# Patient Record
Sex: FEMALE | Race: WHITE | NOT HISPANIC OR LATINO | Employment: FULL TIME | URBAN - METROPOLITAN AREA
[De-identification: names, ages, dates, MRNs, and addresses within clinical notes are randomized per-mention and may not be internally consistent; named-entity substitution may affect disease eponyms.]

---

## 2018-01-08 ENCOUNTER — TRANSFERRED RECORDS (OUTPATIENT)
Dept: HEALTH INFORMATION MANAGEMENT | Facility: CLINIC | Age: 38
End: 2018-01-08

## 2018-01-31 ENCOUNTER — OFFICE VISIT (OUTPATIENT)
Dept: NEUROSURGERY | Facility: CLINIC | Age: 38
End: 2018-01-31
Attending: NURSE PRACTITIONER
Payer: COMMERCIAL

## 2018-01-31 VITALS
SYSTOLIC BLOOD PRESSURE: 141 MMHG | DIASTOLIC BLOOD PRESSURE: 84 MMHG | HEART RATE: 75 BPM | WEIGHT: 175.8 LBS | OXYGEN SATURATION: 99 %

## 2018-01-31 DIAGNOSIS — M54.12 CERVICAL RADICULAR PAIN: Primary | ICD-10-CM

## 2018-01-31 DIAGNOSIS — M54.50 LUMBAR PAIN ON PALPATION: ICD-10-CM

## 2018-01-31 PROCEDURE — G0463 HOSPITAL OUTPT CLINIC VISIT: HCPCS | Performed by: NURSE PRACTITIONER

## 2018-01-31 PROCEDURE — 99243 OFF/OP CNSLTJ NEW/EST LOW 30: CPT | Performed by: NURSE PRACTITIONER

## 2018-01-31 RX ORDER — HYDROCODONE BITARTRATE AND ACETAMINOPHEN 5; 325 MG/1; MG/1
1 TABLET ORAL EVERY 6 HOURS PRN
COMMUNITY
End: 2018-08-31

## 2018-01-31 RX ORDER — PROMETHAZINE HYDROCHLORIDE 25 MG/1
25 TABLET ORAL EVERY 6 HOURS PRN
COMMUNITY

## 2018-01-31 RX ORDER — CITALOPRAM HYDROBROMIDE 20 MG/1
20 TABLET ORAL DAILY
COMMUNITY
End: 2019-02-08

## 2018-01-31 ASSESSMENT — PAIN SCALES - GENERAL: PAINLEVEL: SEVERE PAIN (7)
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
## 2018-01-31 NOTE — PATIENT INSTRUCTIONS
1.  Chiropractic care with Dr. Case.      2.  Injection therapy with cervical epidural injection.  [phone]    3.  Please contact the clinic if pain persists at [phone].

## 2018-01-31 NOTE — MR AVS SNAPSHOT
After Visit Summary   1/31/2018    Savita Matthews    MRN: [id number]           Patient Information     Date Of Birth          1980        Visit Information        Provider Department      1/31/2018 2:40 PM Buffy Quiroz APRN CNP [facility]        Today's Diagnoses     Cervical radicular pain    -  1    Lumbar pain on palpation          Care Instructions    1.  Chiropractic care with Dr. Case.      2.  Injection therapy with cervical epidural injection.  [phone]    3.  Please contact the clinic if pain persists at [phone].           Follow-ups after your visit        Additional Services     [facility] PT, HAND, AND CHIROPRACTIC REFERRAL       **This order will print in the [facility] Scheduling Office** Dr. Case     Physical Therapy, Hand Therapy and Chiropractic Care are available through:    *[facility]  *[facility]  *[facility]    Call one number to schedule at any of the above locations: [phone].    Your provider has referred you to: Chiropractic at [facility] or [facility]    Indication/Reason for Referral:neck and low back painb  Onset of Illness: 6/2017  Therapy Orders: Evaluate and Treat  Special Programs: Acupuncture and None  Special Request: Manual Therapy: Myofascial Release/Massage  None    Kin Sanderson      Additional Comments for the Therapist or Chiropractor:     Please be aware that coverage of these services is subject to the terms and limitations of your health insurance plan.  Call member services at your health plan with any benefit or coverage questions.      Please bring the following to your appointment:    *Your personal calendar for scheduling future appointments  *Comfortable clothing                  Your next 10 appointments already scheduled     Jan 31, 2018  2:40 PM CST   New Visit with KRAIG Aguilar CNP   [facility] ([facility])  "   [address]  [facility] [id number] [phone]              Future tests that were ordered for you today     Open Future Orders        Priority Expected Expires Ordered    XR Cervical/Thoracic Epidural Inj Incl Imaging Routine 2018            Who to contact     If you have questions or need follow up information about today's clinic visit or your schedule please contact [facility] directly at [phone].  Normal or non-critical lab and imaging results will be communicated to you by MyChart, letter or phone within 4 business days after the clinic has received the results. If you do not hear from us within 7 days, please contact the clinic through [employer] or phone. If you have a critical or abnormal lab result, we will notify you by phone as soon as possible.  Submit refill requests through [facility] or call your pharmacy and they will forward the refill request to us. Please allow 3 business days for your refill to be completed.          Additional Information About Your Visit        [employer] Information     [facility] lets you send messages to your doctor, view your test results, renew your prescriptions, schedule appointments and more. To sign up, go to [email or web address] . Click on \"Log in\" on the left side of the screen, which will take you to the Welcome page. Then click on \"Sign up Now\" on the right side of the page.     You will be asked to enter the access code listed below, as well as some personal information. Please follow the directions to create your username and password.     Your access code is: [email or web address]  Expires: 2018  2:39 PM     Your access code will  in 90 days. If you need help or a new code, please call your [place] clinic or [phone].        Care EveryWhere ID     This is your Care EveryWhere ID. This could be used by other organizations to access your [place] medical records  [id number]        Your " Vitals Were     Pulse Pulse Oximetry                75 99%           Blood Pressure from Last 3 Encounters:   01/31/18 141/84    Weight from Last 3 Encounters:   01/31/18 175 lb 12.8 oz (79.7 kg)              We Performed the Following     [facility] PT, HAND, AND CHIROPRACTIC REFERRAL        Primary Care Provider Office Phone # Fax #    [facility] [phone] [address]  [facility] [address]        Equal Access to Services     HOA [place] : Hadii aad ku hadasho Soomaali, waaxda luqadaha, qaybta kaalmada adeegyada, waxay idiin hayaan adeeg kharash lalester . So [facility] [phone].    ATENCIÓN: Si habla español, tiene a lui disposición servicios gratuitos de asistencia lingüística. [facility] [phone].    We comply with applicable federal civil rights laws and [place] laws. We do not discriminate on the basis of race, color, national origin, age, disability, sex, sexual orientation, or gender identity.            Thank you!     Thank you for choosing [facility]  for your care. Our goal is always to provide you with excellent care. Hearing back from our patients is one way we can continue to improve our services. Please take a few minutes to complete the written survey that you may receive in the mail after your visit with us. Thank you!             Your Updated Medication List - Protect others around you: Learn how to safely use, store and throw away your medicines at www.disposemymeds.org.          This list is accurate as of 1/31/18  2:39 PM.  Always use your most recent med list.                   Brand Name Dispense Instructions for use Diagnosis    BACLOFEN PO      Take 10 mg by mouth 2 times daily        citalopram 20 MG tablet    celeXA     Take 20 mg by mouth daily        GABAPENTIN PO      Take 100 mg by mouth daily        HYDROcodone-acetaminophen 5-325 MG per tablet    NORCO     Take 1 tablet by mouth every 6 hours as needed for moderate to  severe pain        MELOXICAM PO      Take 15 mg by mouth daily        promethazine 25 MG tablet    PHENERGAN     Take 25 mg by mouth every 6 hours as needed for nausea        RIZATRIPTAN BENZOATE PO      Take 5 mg by mouth once as needed for migraine        TIZANIDINE HCL PO      Take 2 mg by mouth once        TYLENOL PO      Take 500 mg by mouth 2 times daily
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
## 2018-02-05 ENCOUNTER — TELEPHONE (OUTPATIENT)
Dept: NEUROSURGERY | Facility: CLINIC | Age: 38
End: 2018-02-05

## 2018-02-05 NOTE — TELEPHONE ENCOUNTER
Pt would like call back to discuss specific verbiage in documentation to be submitted to insurance regarding her neck. Also wants to discuss increased leg symptoms. Try cell first, then work number. Ok to leave detailed message on cell.

## 2018-02-05 NOTE — TELEPHONE ENCOUNTER
Spoke to Savita via phone.  She will request a copy of dictated notes for her records from our medical records department.  She states that she is scheduled for her injection tomorrow and is hopeful that this procedure will provide some relief.  She would like to reports that over the past 1-2 days she has noted an increase in lower back pain with some radiation down her right leg, which she reports is new from her previous visit.  She denies any acute even which precipitated her increase level of pain.  I instructed her to call again with any new or worsening symptoms.

## 2018-02-06 ENCOUNTER — HOSPITAL ENCOUNTER (OUTPATIENT)
Facility: CLINIC | Age: 38
Discharge: HOME | End: 2018-02-06
Admitting: PHYSICIAN ASSISTANT
Payer: COMMERCIAL

## 2018-02-06 ENCOUNTER — HOSPITAL ENCOUNTER (OUTPATIENT)
Dept: GENERAL RADIOLOGY | Facility: CLINIC | Age: 38
End: 2018-02-06
Attending: NURSE PRACTITIONER | Admitting: COLON & RECTAL SURGERY
Payer: COMMERCIAL

## 2018-02-06 VITALS
HEART RATE: 64 BPM | BODY MASS INDEX: 25.01 KG/M2 | TEMPERATURE: 97 F | SYSTOLIC BLOOD PRESSURE: 148 MMHG | RESPIRATION RATE: 18 BRPM | DIASTOLIC BLOOD PRESSURE: 65 MMHG | OXYGEN SATURATION: 99 % | HEIGHT: 68 IN | WEIGHT: 165 LBS

## 2018-02-06 DIAGNOSIS — M54.12 CERVICAL RADICULAR PAIN: ICD-10-CM

## 2018-02-06 PROCEDURE — 25000125 ZZHC RX 250: Performed by: PHYSICIAN ASSISTANT

## 2018-02-06 PROCEDURE — 27210986 XR CERVICAL/THORACIC EPIDURAL INJ, INCL IMAGING

## 2018-02-06 PROCEDURE — 40000863 ZZH STATISTIC RADIOLOGY XRAY, US, CT, MAR, NM

## 2018-02-06 PROCEDURE — 25000128 H RX IP 250 OP 636: Performed by: PHYSICIAN ASSISTANT

## 2018-02-06 PROCEDURE — 25500064 ZZH RX 255 OP 636: Performed by: PHYSICIAN ASSISTANT

## 2018-02-06 PROCEDURE — 25000132 ZZH RX MED GY IP 250 OP 250 PS 637: Performed by: PHYSICIAN ASSISTANT

## 2018-02-06 RX ORDER — OXYCODONE AND ACETAMINOPHEN 5; 325 MG/1; MG/1
1 TABLET ORAL EVERY 4 HOURS PRN
COMMUNITY
End: 2018-08-31

## 2018-02-06 RX ORDER — HYDROCODONE BITARTRATE AND ACETAMINOPHEN 5; 325 MG/1; MG/1
1-2 TABLET ORAL ONCE
Status: COMPLETED | OUTPATIENT
Start: 2018-02-06 | End: 2018-02-06

## 2018-02-06 RX ORDER — DEXAMETHASONE SODIUM PHOSPHATE 10 MG/ML
2 INJECTION, SOLUTION INTRAMUSCULAR; INTRAVENOUS ONCE
Status: COMPLETED | OUTPATIENT
Start: 2018-02-06 | End: 2018-02-06

## 2018-02-06 RX ORDER — IOPAMIDOL 408 MG/ML
10 INJECTION, SOLUTION INTRATHECAL ONCE
Status: COMPLETED | OUTPATIENT
Start: 2018-02-06 | End: 2018-02-06

## 2018-02-06 RX ADMIN — LIDOCAINE HYDROCHLORIDE 2.5 ML: 10 INJECTION, SOLUTION EPIDURAL; INFILTRATION; INTRACAUDAL; PERINEURAL at 15:35

## 2018-02-06 RX ADMIN — IOPAMIDOL 0.5 ML: 408 INJECTION, SOLUTION INTRATHECAL at 15:36

## 2018-02-06 RX ADMIN — DEXAMETHASONE SODIUM PHOSPHATE 2 MG: 10 INJECTION, SOLUTION INTRAMUSCULAR; INTRAVENOUS at 15:48

## 2018-02-06 RX ADMIN — HYDROCODONE BITARTRATE AND ACETAMINOPHEN 1 TABLET: 5; 325 TABLET ORAL at 16:18

## 2018-02-06 NOTE — PROGRESS NOTES
1555  Patient returned to [facility], s/p NOLAN.  She is crying, states pain is 10/10.  I asked the [employer] tech to have Theo JOSHI put in some Vicodin orders.     1605  Pain down to 7/10 over about 10 minutes.  I paged Theo Crews, asking for Vicodin order.  Patient taking po fluids, declined snack.   at bedside.      1640  Patient starting to feel better from Vicodin.  Pain rated about 4/10.  Site is WDL, no bleeding or swelling.      1650  Discharged per W/C with .

## 2018-02-06 NOTE — DISCHARGE INSTRUCTIONS
Steroid Injection Discharge Instructions     After you go home:      You may resume your normal diet.    Care of Puncture Site:      If you have a bandaid on your puncture site, you may remove it the next morning    You may shower tomorrow    No bath tubs, whirlpools or swimming for at least 3 days     Activity:      You may go back to normal activity in 24 hours    You should let pain be your guide as to the extent of your activities    Maintain any activity limitations as ordered by your provider    Do NOT drive a vehicle until tomorrow    Medicines:      You may resume all medications    For minor pain, you may take Acetaminophen (Tylenol) or Ibuprofen (Advil)    Pain:       You may experience increased or different pain over the next 24-48 hours    For the next 48 hrs - you may use ice packs for discomfort     Call your primary care doctor if:      You have severe pain that does not improve with pain medication    You have chills or a fever greater than 101 F (38 C)    The site is red, swollen, hot or tender    New problems with your bowel or bladder    Any questions or concerns.          For Your Information:      A steroid was injected to help decrease swelling and may help to reduce pain. It may take up to 7-10 days to obtain full results.    Some patients will get lasting relief from a single injection. Others may require up to 3 injections to get results. If you have more than one steroid injection, they should be given 2 weeks apart.    Side effects of your steroid injection are mild and will go away in 2-3 days  - Insomnia  - Heartburn  - Flushed face  - Water retention  - Increased appetite  - Increased blood sugar      If you have questions call:        [facility] Radiology Dept @ [phone]

## 2018-02-06 NOTE — IP AVS SNAPSHOT
MRN:[id number]                      After Visit Summary   2/6/2018    Savita Matthews    MRN: [id number]           Visit Information        Department      2/6/2018  1:57 PM [facility]          Review of your medicines      UNREVIEWED medicines. Ask your doctor about these medicines        Dose / Directions    BACLOFEN PO        Dose:  10 mg   Take 10 mg by mouth 2 times daily   Refills:  0       citalopram 20 MG tablet   Commonly known as:  celeXA        Dose:  20 mg   Take 20 mg by mouth daily   Refills:  0       GABAPENTIN PO        Dose:  200 mg   Take 200 mg by mouth At Bedtime   Refills:  0       HYDROcodone-acetaminophen 5-325 MG per tablet   Commonly known as:  NORCO        Dose:  1 tablet   Take 1 tablet by mouth every 6 hours as needed for moderate to severe pain   Refills:  0       MELOXICAM PO        Dose:  15 mg   Take 15 mg by mouth daily   Refills:  0       oxyCODONE-acetaminophen 5-325 MG per tablet   Commonly known as:  PERCOCET        Dose:  1 tablet   Take 1 tablet by mouth every 4 hours as needed for moderate to severe pain   Refills:  0       promethazine 25 MG tablet   Commonly known as:  PHENERGAN        Dose:  25 mg   Take 25 mg by mouth every 6 hours as needed for nausea   Refills:  0       RIZATRIPTAN BENZOATE PO        Dose:  5 mg   Take 5 mg by mouth once as needed for migraine   Refills:  0       TIZANIDINE HCL PO        Dose:  2 mg   Take 2 mg by mouth once   Refills:  0       TYLENOL PO        Dose:  500 mg   Take 500 mg by mouth 2 times daily   Refills:  0                Protect others around you: Learn how to safely use, store and throw away your medicines at www.disposemymeds.org.         Follow-ups after your visit        Your next 10 appointments already scheduled     Feb 06, 2018  3:00 PM CST   XR CERVICAL/THORACIC EPIDURAL INJECTION with DRISSXR4, DRISS IMAGING NURSE, SH MSK RAD   [facility] Radiology ([facility])    [address]  [place] [id number] [phone]           For nerve root injection, please send or bring copies of any MRIs or other scans you have had.  Bring a list of your current medicines to your exam. (Include vitamins, minerals and over-the-counter medicines.) Leave your valuables at home.  Plan to have someone drive you home afterward.  Stop taking the following medicines (but talk to your doctor first):   If you take blood thinners, you may need to stop taking them a few days before treatment. Talk to your doctor before stopping these medicines.Stop taking Coumadin (warfarin) 3 days before treatment. Restart the day after treatment.   If you take Plavix, Ticlid, Pletal or Persantine, please ask your doctor if you should stop these medicines. You may need extra tests on the morning of your scan.   If you take Xarelto (Rivaroxaban), you may need to stop taking it 24 hours before treatment. Talk to your doctor before stopping this medicine. Restart the day after treatment.  You may take your other medicines as normal.  Stop all food and drink (including water) 3 hours before your test or treatment.  Please tell the doctor:   If you are allergic to X-ray dye (contrast fluid).   If you may be pregnant.  Injections take about 30 to 45 minutes. Most people spend up to 2 hours in the clinic or hospital.  Please call the Imaging Department at your exam site with any questions.            Feb 26, 2018  9:30 AM CST   (Arrive by 9:15 AM)   [facility] Chiropractor with DAVIDE Virk ([facility]  )    [address]  [place] [id number] [phone]               Care Instructions        Further instructions from your care team       Steroid Injection Discharge Instructions     After you go home:      You may resume your normal diet.    Care of Puncture Site:      If you have a bandaid on your puncture site, you may remove it the next morning    You may shower tomorrow    No  "bath tubs, whirlpools or swimming for at least 3 days     Activity:      You may go back to normal activity in 24 hours    You should let pain be your guide as to the extent of your activities    Maintain any activity limitations as ordered by your provider    Do NOT drive a vehicle until tomorrow    Medicines:      You may resume all medications    For minor pain, you may take Acetaminophen (Tylenol) or Ibuprofen (Advil)    Pain:       You may experience increased or different pain over the next 24-48 hours    For the next 48 hrs - you may use ice packs for discomfort     Call your primary care doctor if:      You have severe pain that does not improve with pain medication    You have chills or a fever greater than 101 F (38 C)    The site is red, swollen, hot or tender    New problems with your bowel or bladder    Any questions or concerns.          For Your Information:      A steroid was injected to help decrease swelling and may help to reduce pain. It may take up to 7-10 days to obtain full results.    Some patients will get lasting relief from a single injection. Others may require up to 3 injections to get results. If you have more than one steroid injection, they should be given 2 weeks apart.    Side effects of your steroid injection are mild and will go away in 2-3 days  - Insomnia  - Heartburn  - Flushed face  - Water retention  - Increased appetite  - Increased blood sugar      If you have questions call:        [facility] Radiology Dept @ [phone]               Additional Information About Your Visit        CoAlignharWhereoscope Information     CoAlignhart lets you send messages to your doctor, view your test results, renew your prescriptions, schedule appointments and more. To sign up, go to [email or web address] . Click on \"Log in\" on the left side of the screen, which will take you to the Welcome page. Then click on \"Sign up Now\" on the right side of the page.     You will be asked to enter the access " "code listed below, as well as some personal information. Please follow the directions to create your username and password.     Your access code is: [email or web address]  Expires: 2018  2:39 PM     Your access code will  in 90 days. If you need help or a new code, please call your [facility] clinic or [phone].        Care EveryWhere ID     This is your Care EveryWhere ID. This could be used by other organizations to access your [facility] medical records  [id number]        Your Vitals Were     Blood Pressure Pulse Temperature Respirations Height Weight    140/85 (BP Location: Left arm) 76 97  F (36.1  C) (Oral) 16 1.727 m (5' 8\") 74.8 kg (165 lb)    Pulse Oximetry BMI (Body Mass Index)                99% 25.09 kg/m2           Primary Care Provider Office Phone # Fax #    [facility] [phone]      Equal Access to Services     HOA [place] : Hadii aad ku hadasho Soomaali, waaxda luqadaha, qaybta kaalmada adeegyada, waxay idiin hayjanette franklin . So [facility] [phone].    ATENCIÓN: Si habla español, tiene a lui disposición servicios gratuitos de asistencia lingüística. Pabloame al [phone].    We comply with applicable federal civil rights laws and [place] laws. We do not discriminate on the basis of race, color, national origin, age, disability, sex, sexual orientation, or gender identity.            Thank you!     Thank you for choosing [facility] for your care. Our goal is always to provide you with excellent care. Hearing back from our patients is one way we can continue to improve our services. Please take a few minutes to complete the written survey that you may receive in the mail after you visit with us. Thank you!             Medication List: This is a list of all your medications and when to take them. Check marks below indicate your daily home schedule. Keep this list as a reference.      Medications           Morning Afternoon Evening Bedtime As Needed    " BACLOFEN PO   Take 10 mg by mouth 2 times daily                                citalopram 20 MG tablet   Commonly known as:  celeXA   Take 20 mg by mouth daily                                GABAPENTIN PO   Take 200 mg by mouth At Bedtime                                HYDROcodone-acetaminophen 5-325 MG per tablet   Commonly known as:  NORCO   Take 1 tablet by mouth every 6 hours as needed for moderate to severe pain                                MELOXICAM PO   Take 15 mg by mouth daily                                oxyCODONE-acetaminophen 5-325 MG per tablet   Commonly known as:  PERCOCET   Take 1 tablet by mouth every 4 hours as needed for moderate to severe pain                                promethazine 25 MG tablet   Commonly known as:  PHENERGAN   Take 25 mg by mouth every 6 hours as needed for nausea                                RIZATRIPTAN BENZOATE PO   Take 5 mg by mouth once as needed for migraine                                TIZANIDINE HCL PO   Take 2 mg by mouth once                                TYLENOL PO   Take 500 mg by mouth 2 times daily

## 2018-02-06 NOTE — PROGRESS NOTES
RADIOLOGY PROCEDURE NOTE  Patient name: Savita Matthews  MRN: [id number]  : 1980    Pre-procedure diagnosis: Neck pain  Post-procedure diagnosis: Same    Procedure Date/Time: 2018  3:44 PM  Procedure: Cervical EVERTON  Estimated blood loss: None  Specimen(s) collected with description: none  The patient tolerated the procedure well with no immediate complications.  Significant findings:none    See imaging dictation for procedural details.    Provider name: Theo Crews  Assistant(s):None

## 2018-02-06 NOTE — PROGRESS NOTES
Pre  Procedure plan of care reviewed with pt and spouse pre procedure.  All questions answered and pt appears to accept and understand. D/C instructions also reviewed prior to procedure.

## 2018-02-06 NOTE — IP AVS SNAPSHOT
[place] [address]    [place] [address]    Phone:  [phone]                                       After Visit Summary   2/6/2018    Savita Matthews    MRN: [id number]           After Visit Summary Signature Page     I have received my discharge instructions, and my questions have been answered. I have discussed any challenges I see with this plan with the nurse or doctor.    ..........................................................................................................................................  Patient/Patient Representative Signature      ..........................................................................................................................................  Patient Representative Print Name and Relationship to Patient    ..................................................               ................................................  Date                                            Time    ..........................................................................................................................................  Reviewed by Signature/Title    ...................................................              ..............................................  Date                                                            Time

## 2018-02-20 ENCOUNTER — TELEPHONE (OUTPATIENT)
Dept: NEUROSURGERY | Facility: CLINIC | Age: 38
End: 2018-02-20

## 2018-02-20 DIAGNOSIS — M54.12 CERVICAL RADICULAR PAIN: Primary | ICD-10-CM

## 2018-02-20 NOTE — TELEPHONE ENCOUNTER
REASON FOR CALL:  Pt left message on clinic vm for GALILEO Leung. States she missed Xochitl's last call and is requesting a call back again.    Detailed message can be left:  YES

## 2018-02-21 NOTE — TELEPHONE ENCOUNTER
Per Buffy Quiroz, CNP recommends repeat injection and that [facility] no longer does injections with sedation. Offered injection with sedation over at [facility]. Patient declined and strongly prefers to go to [facility] for injection and she said she is ok to go without the sedation. Order placed in [facility]. Provided patient with number to central scheduling. Advised patient to call back if pain persists 2 weeks post EVERTON. Patient verbalized understanding.

## 2018-02-21 NOTE — TELEPHONE ENCOUNTER
Spoke to patient . Patient had NOLAN 2 weeks ago. She said that the injection has really helped but is still having continued pain. She c/o pain down right arm, weakness along with T/N down right arm. She has chiro appt coming up on 2/26/18. Patient is wondering if Buffy Quiroz CNP would have any further recommendations. Call routed to Buffy Quiroz CNP.

## 2018-02-23 ENCOUNTER — HOSPITAL ENCOUNTER (OUTPATIENT)
Dept: GENERAL RADIOLOGY | Facility: CLINIC | Age: 38
End: 2018-02-23
Attending: NURSE PRACTITIONER | Admitting: RADIOLOGY
Payer: COMMERCIAL

## 2018-02-23 ENCOUNTER — HOSPITAL ENCOUNTER (OUTPATIENT)
Facility: CLINIC | Age: 38
Discharge: HOME | End: 2018-02-23
Attending: RADIOLOGY | Admitting: RADIOLOGY
Payer: COMMERCIAL

## 2018-02-23 VITALS
OXYGEN SATURATION: 100 % | SYSTOLIC BLOOD PRESSURE: 135 MMHG | RESPIRATION RATE: 16 BRPM | TEMPERATURE: 96.9 F | DIASTOLIC BLOOD PRESSURE: 73 MMHG | HEART RATE: 66 BPM

## 2018-02-23 DIAGNOSIS — M54.12 CERVICAL RADICULAR PAIN: ICD-10-CM

## 2018-02-23 PROCEDURE — 25000132 ZZH RX MED GY IP 250 OP 250 PS 637: Performed by: PHYSICIAN ASSISTANT

## 2018-02-23 PROCEDURE — 25000128 H RX IP 250 OP 636: Performed by: NURSE PRACTITIONER

## 2018-02-23 PROCEDURE — 25000125 ZZHC RX 250: Performed by: NURSE PRACTITIONER

## 2018-02-23 PROCEDURE — 25500064 ZZH RX 255 OP 636: Performed by: NURSE PRACTITIONER

## 2018-02-23 PROCEDURE — 62321 NJX INTERLAMINAR CRV/THRC: CPT

## 2018-02-23 PROCEDURE — 40000863 ZZH STATISTIC RADIOLOGY XRAY, US, CT, MAR, NM

## 2018-02-23 RX ORDER — HYDROCODONE BITARTRATE AND ACETAMINOPHEN 5; 325 MG/1; MG/1
1 TABLET ORAL EVERY 6 HOURS PRN
Status: DISCONTINUED | OUTPATIENT
Start: 2018-02-23 | End: 2018-02-24 | Stop reason: HOSPADM

## 2018-02-23 RX ORDER — DEXAMETHASONE SODIUM PHOSPHATE 10 MG/ML
20 INJECTION, SOLUTION INTRAMUSCULAR; INTRAVENOUS ONCE
Status: COMPLETED | OUTPATIENT
Start: 2018-02-23 | End: 2018-02-23

## 2018-02-23 RX ORDER — IOPAMIDOL 408 MG/ML
10 INJECTION, SOLUTION INTRATHECAL ONCE
Status: COMPLETED | OUTPATIENT
Start: 2018-02-23 | End: 2018-02-23

## 2018-02-23 RX ADMIN — DEXAMETHASONE SODIUM PHOSPHATE 20 MG: 10 INJECTION, SOLUTION INTRAMUSCULAR; INTRAVENOUS at 10:32

## 2018-02-23 RX ADMIN — IOPAMIDOL 1 ML: 408 INJECTION, SOLUTION INTRATHECAL at 10:28

## 2018-02-23 RX ADMIN — LIDOCAINE HYDROCHLORIDE 3 ML: 10 INJECTION, SOLUTION EPIDURAL; INFILTRATION; INTRACAUDAL; PERINEURAL at 10:26

## 2018-02-23 RX ADMIN — HYDROCODONE BITARTRATE AND ACETAMINOPHEN 1 TABLET: 5; 325 TABLET ORAL at 10:50

## 2018-02-23 NOTE — IP AVS SNAPSHOT
MRN:[id number]                      After Visit Summary   2/23/2018    Savita Matthews    MRN: [id number]           Visit Information        Department      2/23/2018  9:15 AM [facility]          Review of your medicines      UNREVIEWED medicines. Ask your doctor about these medicines        Dose / Directions    ATIVAN PO   Indication:  Feeling Anxious        Dose:  0.5 mg   Take 0.5 mg by mouth   Refills:  0       BACLOFEN PO        Dose:  10 mg   Take 10 mg by mouth 2 times daily   Refills:  0       citalopram 20 MG tablet   Commonly known as:  celeXA        Dose:  20 mg   Take 20 mg by mouth daily   Refills:  0       GABAPENTIN PO        Dose:  200 mg   Take 200 mg by mouth At Bedtime   Refills:  0       HYDROcodone-acetaminophen 5-325 MG per tablet   Commonly known as:  NORCO        Dose:  1 tablet   Take 1 tablet by mouth every 6 hours as needed for moderate to severe pain   Refills:  0       MELOXICAM PO        Dose:  15 mg   Take 15 mg by mouth daily   Refills:  0       oxyCODONE-acetaminophen 5-325 MG per tablet   Commonly known as:  PERCOCET        Dose:  1 tablet   Take 1 tablet by mouth every 4 hours as needed for moderate to severe pain   Refills:  0       promethazine 25 MG tablet   Commonly known as:  PHENERGAN        Dose:  25 mg   Take 25 mg by mouth every 6 hours as needed for nausea   Refills:  0       RIZATRIPTAN BENZOATE PO        Dose:  5 mg   Take 5 mg by mouth once as needed for migraine   Refills:  0       TIZANIDINE HCL PO        Dose:  2 mg   Take 2 mg by mouth once   Refills:  0       TYLENOL PO        Dose:  500 mg   Take 500 mg by mouth 2 times daily   Refills:  0       VALIUM PO   Indication:  Feeling Anxious        Dose:  5 mg   Take 5 mg by mouth   Refills:  0                Protect others around you: Learn how to safely use, store and throw away your medicines at www.disposemymeds.org.         Follow-ups after your visit        Your next 10  appointments already scheduled     Feb 23, 2018 10:00 AM CST   XR CERVICAL/THORACIC EPIDURAL INJECTION with DRISS ISLAS IMAGING NURSE, DRISS MSK RAD   [facility] Radiology ([facility])    [address]  [place] [id number] [phone]           For nerve root injection, please send or bring copies of any MRIs or other scans you have had.  Bring a list of your current medicines to your exam. (Include vitamins, minerals and over-the-counter medicines.) Leave your valuables at home.  Plan to have someone drive you home afterward.  Stop taking the following medicines (but talk to your doctor first):   If you take blood thinners, you may need to stop taking them a few days before treatment. Talk to your doctor before stopping these medicines.Stop taking Coumadin (warfarin) 3 days before treatment. Restart the day after treatment.   If you take Plavix, Ticlid, Pletal or Persantine, please ask your doctor if you should stop these medicines. You may need extra tests on the morning of your scan.   If you take Xarelto (Rivaroxaban), you may need to stop taking it 24 hours before treatment. Talk to your doctor before stopping this medicine. Restart the day after treatment.  You may take your other medicines as normal.  Stop all food and drink (including water) 3 hours before your test or treatment.  Please tell the doctor:   If you are allergic to X-ray dye (contrast fluid).   If you may be pregnant.  Injections take about 30 to 45 minutes. Most people spend up to 2 hours in the clinic or hospital.  Please call the Imaging Department at your exam site with any questions.            Feb 26, 2018  9:30 AM CST   (Arrive by 9:15 AM)   [facility] Chiropractor with DAVIDE Virk ([facility] Nicole  )    [address]  [place] [id number] [phone]               Care Instructions        Further instructions from your care team       Steroid Injection Discharge  Instructions     After you go home:      You may resume your normal diet.    Care of Puncture Site:      If you have a bandaid on your puncture site, you may remove it the next morning    You may shower tomorrow    No bath tubs, whirlpools or swimming for at least 3 days     Activity:      You may go back to normal activity in 24 hours    You should let pain be your guide as to the extent of your activities    Maintain any activity limitations as ordered by your provider    Do NOT drive a vehicle until tomorrow    Medicines:      You may resume all medications    Resume your Warfarin/Coumadin at your regular dose today. Follow up with your provider to have your INR rechecked    Resume your Platelet Inhibitors and Aspirin tomorrow at your regular dose    For minor pain, you may take Acetaminophen (Tylenol) or Ibuprofen (Advil)    Pain:       You may experience increased or different pain over the next 24-48 hours    For the next 48 hrs - you may use ice packs for discomfort     Call your primary care doctor if:      You have severe pain that does not improve with pain medication    You have chills or a fever greater than 101 F (38 C)    The site is red, swollen, hot or tender    New problems with your bowel or bladder    Any questions or concerns    Other Instructions:      New numbness down your leg post injection is temporary and may last for up to 6 hours. You may need assistance with activity until your leg has normal sensation.    If you are diabetic, monitor your blood sugar closely. Contact the provider who manages your diabetes to help you control your blood sugar if needed.    For Your Information:      A steroid was injected to help decrease swelling and may help to reduce pain. It may take up to 7-10 days to obtain full results.    Some patients will get lasting relief from a single injection. Others may require up to 3 injections to get results. If you have more than one steroid injection, they should be  "given 2 weeks apart.    Side effects of your steroid injection are mild and will go away in 2-3 days  - Insomnia  - Heartburn  - Flushed face  - Water retention  - Increased appetite  - Increased blood sugar      If you have questions call:        [facility] Radiology Dept @ [phone]      The provider who performed your procedure was _________________.         Additional Information About Your Visit        MyChart Information     NutshellMail lets you send messages to your doctor, view your test results, renew your prescriptions, schedule appointments and more. To sign up, go to [email or web address] . Click on \"Log in\" on the left side of the screen, which will take you to the Welcome page. Then click on \"Sign up Now\" on the right side of the page.     You will be asked to enter the access code listed below, as well as some personal information. Please follow the directions to create your username and password.     Your access code is: [email or web address]  Expires: 2018  2:39 PM     Your access code will  in 90 days. If you need help or a new code, please call your [facility] clinic or [phone].        Care EveryWhere ID     This is your Care EveryWhere ID. This could be used by other organizations to access your [facility] medical records  [id number]        Your Vitals Were     Blood Pressure Pulse Temperature Respirations Pulse Oximetry       142/72 (BP Location: Left arm) 66 96.9  F (36.1  C) (Oral) 18 98%        Primary Care Provider Office Phone # Fax #    [facility] [phone]      Equal Access to Services     HOA [place] : Hadtimur Miranda, ghada oliva, alexandria anaya. So [facility] [phone].    ATENCIÓN: Si habla español, tiene a lui disposición servicios gratuitos de asistencia lingüística. Abbie al [phone].    We comply with applicable federal civil rights laws and [place] laws. We " do not discriminate on the basis of race, color, national origin, age, disability, sex, sexual orientation, or gender identity.            Thank you!     Thank you for choosing [facility] for your care. Our goal is always to provide you with excellent care. Hearing back from our patients is one way we can continue to improve our services. Please take a few minutes to complete the written survey that you may receive in the mail after you visit with us. Thank you!             Medication List: This is a list of all your medications and when to take them. Check marks below indicate your daily home schedule. Keep this list as a reference.      Medications           Morning Afternoon Evening Bedtime As Needed    ATIVAN PO   Take 0.5 mg by mouth                                BACLOFEN PO   Take 10 mg by mouth 2 times daily                                citalopram 20 MG tablet   Commonly known as:  celeXA   Take 20 mg by mouth daily                                GABAPENTIN PO   Take 200 mg by mouth At Bedtime                                HYDROcodone-acetaminophen 5-325 MG per tablet   Commonly known as:  NORCO   Take 1 tablet by mouth every 6 hours as needed for moderate to severe pain                                MELOXICAM PO   Take 15 mg by mouth daily                                oxyCODONE-acetaminophen 5-325 MG per tablet   Commonly known as:  PERCOCET   Take 1 tablet by mouth every 4 hours as needed for moderate to severe pain                                promethazine 25 MG tablet   Commonly known as:  PHENERGAN   Take 25 mg by mouth every 6 hours as needed for nausea                                RIZATRIPTAN BENZOATE PO   Take 5 mg by mouth once as needed for migraine                                TIZANIDINE HCL PO   Take 2 mg by mouth once                                TYLENOL PO   Take 500 mg by mouth 2 times daily                                VALIUM PO   Take 5 mg by mouth

## 2018-02-23 NOTE — PROGRESS NOTES
Patient s/p epidural injection.  Had rash on front of neck but has resolved.  Complained of pain 8/10 at injection site.  Norco 1 tab given.  Pain decreased to 4/10.  All VSS.  In good spirits.   Abhijeet here to drive her home.  Discharged per private vehicle.

## 2018-02-23 NOTE — PROGRESS NOTES
RADIOLOGY PROCEDURE NOTE  Patient name: Savita Matthews  MRN: [id number]  : 1980    Pre-procedure diagnosis: Neck pain  Post-procedure diagnosis: Same    Procedure Date/Time: 2018  10:36 AM  Procedure: Cervical EVERTON  Estimated blood loss: None  Specimen(s) collected with description: none  The patient tolerated the procedure well with no immediate complications.  Significant findings:Mild rash on the back shortly after the procedure which seemed to be very transient.  Duration 1 minute.  See imaging dictation for procedural details.    Provider name: Theo Crews  Assistant(s):None

## 2018-02-23 NOTE — IP AVS SNAPSHOT
[facility] [address]    [place] [address]    Phone:  [phone]                                       After Visit Summary   2/23/2018    Savita Matthews    MRN: [id number]           After Visit Summary Signature Page     I have received my discharge instructions, and my questions have been answered. I have discussed any challenges I see with this plan with the nurse or doctor.    ..........................................................................................................................................  Patient/Patient Representative Signature      ..........................................................................................................................................  Patient Representative Print Name and Relationship to Patient    ..................................................               ................................................  Date                                            Time    ..........................................................................................................................................  Reviewed by Signature/Title    ...................................................              ..............................................  Date                                                            Time

## 2018-02-23 NOTE — PROGRESS NOTES
Care Suites Arrival    Reason for Visit: Epidural injection    A/O. Denies pain. D/C instructions reviewed with pt with verbal understanding received. Copy given to patient.  All questions & concerns addressed.     Pt resting on cart, denies additional needs at this time, call light within reach. Family at bedside.  Will cont to monitor.

## 2018-02-23 NOTE — DISCHARGE INSTRUCTIONS
Steroid Injection Discharge Instructions     After you go home:      You may resume your normal diet.    Care of Puncture Site:      If you have a bandaid on your puncture site, you may remove it the next morning    You may shower tomorrow    No bath tubs, whirlpools or swimming for at least 3 days     Activity:      You may go back to normal activity in 24 hours    You should let pain be your guide as to the extent of your activities    Maintain any activity limitations as ordered by your provider    Do NOT drive a vehicle until tomorrow    Medicines:      You may resume all medications    Resume your Warfarin/Coumadin at your regular dose today. Follow up with your provider to have your INR rechecked    Resume your Platelet Inhibitors and Aspirin tomorrow at your regular dose    For minor pain, you may take Acetaminophen (Tylenol) or Ibuprofen (Advil)    Pain:       You may experience increased or different pain over the next 24-48 hours    For the next 48 hrs - you may use ice packs for discomfort     Call your primary care doctor if:      You have severe pain that does not improve with pain medication    You have chills or a fever greater than 101 F (38 C)    The site is red, swollen, hot or tender    New problems with your bowel or bladder    Any questions or concerns    Other Instructions:      New numbness down your leg post injection is temporary and may last for up to 6 hours. You may need assistance with activity until your leg has normal sensation.    If you are diabetic, monitor your blood sugar closely. Contact the provider who manages your diabetes to help you control your blood sugar if needed.    For Your Information:      A steroid was injected to help decrease swelling and may help to reduce pain. It may take up to 7-10 days to obtain full results.    Some patients will get lasting relief from a single injection. Others may require up to 3 injections to get results. If you have more than one  steroid injection, they should be given 2 weeks apart.    Side effects of your steroid injection are mild and will go away in 2-3 days  - Insomnia  - Heartburn  - Flushed face  - Water retention  - Increased appetite  - Increased blood sugar      If you have questions call:        [facility] Radiology Dept @ [phone]      The provider who performed your procedure was _________________.

## 2018-02-26 ENCOUNTER — THERAPY VISIT (OUTPATIENT)
Dept: CHIROPRACTIC MEDICINE | Facility: CLINIC | Age: 38
End: 2018-02-26
Payer: COMMERCIAL

## 2018-02-26 DIAGNOSIS — M99.04 SOMATIC DYSFUNCTION OF SACRAL REGION: ICD-10-CM

## 2018-02-26 DIAGNOSIS — M54.50 CHRONIC BILATERAL LOW BACK PAIN WITHOUT SCIATICA: ICD-10-CM

## 2018-02-26 DIAGNOSIS — M54.2 CERVICALGIA: Primary | ICD-10-CM

## 2018-02-26 DIAGNOSIS — M99.01 CERVICAL SEGMENT DYSFUNCTION: ICD-10-CM

## 2018-02-26 DIAGNOSIS — M40.00 ACQUIRED POSTURAL KYPHOSIS: ICD-10-CM

## 2018-02-26 DIAGNOSIS — G89.29 CHRONIC BILATERAL LOW BACK PAIN WITHOUT SCIATICA: ICD-10-CM

## 2018-02-26 DIAGNOSIS — M99.02 THORACIC SEGMENT DYSFUNCTION: ICD-10-CM
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
## 2018-02-26 PROCEDURE — 98941 CHIROPRACT MANJ 3-4 REGIONS: CPT | Mod: AT | Performed by: CHIROPRACTOR

## 2018-02-26 PROCEDURE — 99203 OFFICE O/P NEW LOW 30 MIN: CPT | Mod: 25 | Performed by: CHIROPRACTOR

## 2018-03-01 NOTE — PROGRESS NOTES
"Chiropractic Clinic Visit    PCP: {PCP:031262}    Savita Matthews is a 38 year old female who is seen  {FSOC CONSULT:157295} presenting with *** . Patient reports that the onset was *** {gronset:435315::\"When asked, patient denies:\"}. ***.  Prior to onset, the patient was able to ***. Patient notes that due to symptoms, they can only ***. Savita Matthews notes   {gradlinitial:082100}.        Injury: ***    Location of Pain: {side:5002} ***  at the following level(s) {grspinelevels3:557655}  Duration of Pain: *** {DAY/WEEK/MONTH/YEAR:117811}  Rating of Pain at worst: {PAIN SCALE:521687}  Rating of Pain Currently: {PAIN SCALE:094989}  Symptoms are better with: {RELIEVING FACTORS/MSPAIN:857334}  Symptoms are worse with: {Painful Motions:940103}  Additional Features: {Additional Features:996700}     Other evaluation and/or treatments so far consists of: {RELIEVING FACTORS/MSPAIN:168373}    Health History  as reported by the patient:    How does the patient rate their own health:   {grrate:245763}    Current or past medical history:   {grhealthhistory:066505}    Medical allergies  {grallergies:819929}    Past Traumas/Surgeries  {grsurgeries:906198}    Family History  {grfamilyhx:018782}    Medications:  {grmeds:474218}    Occupation:  ***    Primary job tasks:   {grtasks:865615}    Barriers as home/work:   {grbarriers:926808}    Additional health Issues:     ***              Review of Systems  Musculoskeletal: as above  Remainder of review of systems is negative including constitutional, CV, pulmonary, GI, Skin and Neurologic except as noted in HPI or medical history.    No past medical history on file.  No past surgical history on file.    Objective  There were no vitals taken for this visit.    GENERAL APPEARANCE: {EDWARD GENERAL APPEARANCE:50::\"healthy\",\"alert\",\"no distress\"}   GAIT: {FSOC GAIT:418080::\"NORMAL\"}  SKIN: {EDWARD EXAM CPE - SKIN:129794::\"no suspicious lesions or rashes\"}  NEURO: {EDWARD EXAM CPE - " "NEURO:478053::\"Normal strength and tone\",\"mentation intact\",\"speech normal\"}  PSYCH:  {EDWARD EXAM CPE - PSYCH:077697::\"mentation appears normal\",\"affect normal/bright\"}      Savita was asked to complete the Neck Disability Index, the Oswestry Low Back Disability Index and Kin Start Back screening tool, today in the office. {NDI:872728::\"NDI Disability score: ***%; pain severity scale: ***/10.\"}, The Oswestry {OSWESTRYSCORE:699425::\"Disability score: ***%\"}. Ke Start Total Score:{074761:639842} Sub Score: {654386:550867}       Cervical Spine Exam    Range of Motion:   {cervical rom:627968::\"      Full active and passive ROM forward flexion, extension, lateral rotation, lateral flexion.\"}    Inspection:   {cervical inspection:058212::\"      No visible deformity\",\"      normal lordotic curvature maintained\"}    Tender:   {cervical tender:043573}    Non-Tender:   {cervical non-tender:255187::\"     remainder of cervical spine area\"}    Muscle strength:  {cervical strength:895111}    Reflexes:   {cervical reflex:145530}      Sensation:  {cervical sensation:680880::\"     grossly intact througout bilateral upper extremities\"}    Special Tests:  {cervical special tests:741816}  {grcervicalortho:853252}    Lymphatics:   {lymphatics:150987::\"     no edema noted in the upper extremities\"}       Lumbar exam:    Inspection:  {lumbar inspection:330141::\"\"     no visible deformity in the low back\",\"     normal skin\",\"}    ROM:  {lumbar rom:219674}    Tender:  {lumbar tender:592810}    Non Tender:  {lumbar non tender:015360::\"     remainder of lumbar spine\"}    Strength:  {lumbar strength:635952}    Reflexes:  {LE reflex:813274}        Sensation:  {LE sensation:694579::\"    grossly intact throughout lower extremities\"}    Special tests:  {Lumbar Ortho Tests:391416}    Segmental spinal dysfunction/restrictions found at:  {grspinelevels:506755::\":\"}.    The following soft tissue hypotonicities were observed:{CHIRO " "PALPATION:848922}    Trigger points were found in:{grpalpation:805071}    Muscle spasm found in:{grpalpation:809219}      Radiology:  ***    Assessment:    No diagnosis found.    {RX EXPLANATION:242618::\"RX ordered/plan of care\",\"Anticipated outcomes\",\"Possible risks and side effects\"}    After discussing the risk and benefits of care, patient consented to treatment    Prognosis: {PROGNOSIS:959642463::\"Good\"}      Patient's condition:  {grass:166134::\"Patient had restrictions pre-manipulation\"}    Treatment effectiveness:  {greft:396955::\"Post manipulation there is better intersegmental movement\",\"Patient claims to feel looser post manipulation\"}      Plan:    Procedures:  Evaluation and Management  {grexam:436117}    CMT:  {grcmt:398865}  {grregion:424871}    Modalities:  {grmodality:163672}    Therapeutic procedures:  {grther:000151::\"None\"}    Response to Treatment  {grresponsetreatment:409329}    Treatment plan and goals:  Goals:  {grgoals:094250}    Frequency of care  Duration of care is estimated to be *** weeks, from the initial treatment.  It is estimated that the patient will need a total of *** visits to resolve this episode.  For the initial therapeutic trial of care, the frequency is recommended at {FREQUENCY OF TREATMENT:672577}.  A reevaluation would be clinically appropriate in *** visits, to determine progress and further course of care.    In-Office Treatment  {treatmentplanchiro1:553884}              Recommendations:    Instructions:{CHIRO PATIENT INSTRUCTED:015077}    Follow-up:  {RECOMMENDATIONS REHAB CHIRO:270471}       Disclaimer: This note consists of symbols derived from keyboarding, dictation and/or voice recognition software. As a result, there may be errors in the script that have gone undetected. Please consider this when interpreting information found in this chart.      "
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
## 2018-03-03 PROBLEM — M54.2 CERVICALGIA: Status: ACTIVE | Noted: 2018-03-03

## 2018-03-03 PROBLEM — M40.00 ACQUIRED POSTURAL KYPHOSIS: Status: ACTIVE | Noted: 2018-03-03

## 2018-03-03 PROBLEM — M99.04 SOMATIC DYSFUNCTION OF SACRAL REGION: Status: ACTIVE | Noted: 2018-03-03

## 2018-03-03 PROBLEM — M99.02 THORACIC SEGMENT DYSFUNCTION: Status: ACTIVE | Noted: 2018-03-03

## 2018-03-03 PROBLEM — M99.01 CERVICAL SEGMENT DYSFUNCTION: Status: ACTIVE | Noted: 2018-03-03

## 2018-03-03 PROBLEM — M54.50 CHRONIC BILATERAL LOW BACK PAIN WITHOUT SCIATICA: Status: ACTIVE | Noted: 2018-03-03

## 2018-03-03 PROBLEM — G89.29 CHRONIC BILATERAL LOW BACK PAIN WITHOUT SCIATICA: Status: ACTIVE | Noted: 2018-03-03
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
## 2018-03-12 ENCOUNTER — TELEPHONE (OUTPATIENT)
Dept: NEUROSURGERY | Facility: CLINIC | Age: 38
End: 2018-03-12

## 2018-03-12 NOTE — TELEPHONE ENCOUNTER
REASON FOR CALL:  Has had 2 epidural injections, did not provide much relief. Is going to a chiropractor currently. Switched [facility] recently from [facility] to [facility]. She is requesting pain meds. Cannot take narcotics at work and was previously taking meloxicam prior and now that she has switched melany, needs it prescribed again.     Detailed message can be left:  YES

## 2018-03-14 NOTE — TELEPHONE ENCOUNTER
Pt contacted and educated that we will not refill the Meloxicam. Explained she can do OTC Advil.

## 2018-03-14 NOTE — TELEPHONE ENCOUNTER
Pt returned  Xohcitl's call. She is at work until 1:30 today and you can call her work phone #: [phone].   If calling after 1:30, call cell phone #: [phone]

## 2018-03-15 ENCOUNTER — THERAPY VISIT (OUTPATIENT)
Dept: CHIROPRACTIC MEDICINE | Facility: CLINIC | Age: 38
End: 2018-03-15
Payer: COMMERCIAL

## 2018-03-15 DIAGNOSIS — M99.04 SOMATIC DYSFUNCTION OF SACRAL REGION: ICD-10-CM

## 2018-03-15 DIAGNOSIS — M99.01 CERVICAL SEGMENT DYSFUNCTION: ICD-10-CM

## 2018-03-15 DIAGNOSIS — M40.00 ACQUIRED POSTURAL KYPHOSIS: ICD-10-CM

## 2018-03-15 DIAGNOSIS — M54.50 CHRONIC BILATERAL LOW BACK PAIN WITHOUT SCIATICA: Primary | ICD-10-CM

## 2018-03-15 DIAGNOSIS — M54.2 CERVICALGIA: ICD-10-CM

## 2018-03-15 DIAGNOSIS — M99.02 THORACIC SEGMENT DYSFUNCTION: ICD-10-CM
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
## 2018-03-15 DIAGNOSIS — G89.29 CHRONIC BILATERAL LOW BACK PAIN WITHOUT SCIATICA: Primary | ICD-10-CM

## 2018-03-15 PROCEDURE — 98941 CHIROPRACT MANJ 3-4 REGIONS: CPT | Mod: AT | Performed by: CHIROPRACTOR

## 2018-03-15 PROCEDURE — 97110 THERAPEUTIC EXERCISES: CPT | Performed by: CHIROPRACTOR

## 2018-03-15 PROCEDURE — 97035 APP MDLTY 1+ULTRASOUND EA 15: CPT | Performed by: CHIROPRACTOR
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
## 2018-03-22 ENCOUNTER — THERAPY VISIT (OUTPATIENT)
Dept: CHIROPRACTIC MEDICINE | Facility: CLINIC | Age: 38
End: 2018-03-22
Payer: COMMERCIAL

## 2018-03-22 DIAGNOSIS — M54.2 CERVICALGIA: ICD-10-CM

## 2018-03-22 DIAGNOSIS — G89.29 CHRONIC BILATERAL LOW BACK PAIN WITHOUT SCIATICA: Primary | ICD-10-CM

## 2018-03-22 DIAGNOSIS — M54.50 CHRONIC BILATERAL LOW BACK PAIN WITHOUT SCIATICA: Primary | ICD-10-CM

## 2018-03-22 DIAGNOSIS — M99.02 THORACIC SEGMENT DYSFUNCTION: ICD-10-CM

## 2018-03-22 DIAGNOSIS — M99.04 SOMATIC DYSFUNCTION OF SACRAL REGION: ICD-10-CM

## 2018-03-22 DIAGNOSIS — M99.01 CERVICAL SEGMENT DYSFUNCTION: ICD-10-CM
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
## 2018-03-22 DIAGNOSIS — M40.00 ACQUIRED POSTURAL KYPHOSIS: ICD-10-CM

## 2018-03-22 PROCEDURE — 97112 NEUROMUSCULAR REEDUCATION: CPT | Mod: 59 | Performed by: CHIROPRACTOR

## 2018-03-22 PROCEDURE — 98941 CHIROPRACT MANJ 3-4 REGIONS: CPT | Mod: AT | Performed by: CHIROPRACTOR

## 2018-03-22 PROCEDURE — 97810 ACUP 1/> WO ESTIM 1ST 15 MIN: CPT | Mod: GA | Performed by: CHIROPRACTOR
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
## 2018-03-29 ENCOUNTER — THERAPY VISIT (OUTPATIENT)
Dept: CHIROPRACTIC MEDICINE | Facility: CLINIC | Age: 38
End: 2018-03-29
Payer: COMMERCIAL

## 2018-03-29 DIAGNOSIS — G89.29 CHRONIC BILATERAL LOW BACK PAIN WITHOUT SCIATICA: Primary | ICD-10-CM

## 2018-03-29 DIAGNOSIS — M54.2 CERVICALGIA: ICD-10-CM

## 2018-03-29 DIAGNOSIS — M99.04 SOMATIC DYSFUNCTION OF SACRAL REGION: ICD-10-CM

## 2018-03-29 DIAGNOSIS — M54.50 CHRONIC BILATERAL LOW BACK PAIN WITHOUT SCIATICA: Primary | ICD-10-CM
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
## 2018-03-29 DIAGNOSIS — M99.01 CERVICAL SEGMENT DYSFUNCTION: ICD-10-CM

## 2018-03-29 DIAGNOSIS — M99.02 THORACIC SEGMENT DYSFUNCTION: ICD-10-CM

## 2018-03-29 PROBLEM — M40.00 ACQUIRED POSTURAL KYPHOSIS: Status: RESOLVED | Noted: 2018-03-03 | Resolved: 2018-03-29

## 2018-03-29 PROCEDURE — 98941 CHIROPRACT MANJ 3-4 REGIONS: CPT | Mod: AT | Performed by: CHIROPRACTOR

## 2018-03-29 PROCEDURE — 97810 ACUP 1/> WO ESTIM 1ST 15 MIN: CPT | Mod: GA | Performed by: CHIROPRACTOR
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
## 2018-04-03 ENCOUNTER — THERAPY VISIT (OUTPATIENT)
Dept: CHIROPRACTIC MEDICINE | Facility: CLINIC | Age: 38
End: 2018-04-03
Payer: COMMERCIAL
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
## 2018-04-03 DIAGNOSIS — M54.2 CERVICALGIA: ICD-10-CM

## 2018-04-03 DIAGNOSIS — G89.29 CHRONIC BILATERAL LOW BACK PAIN WITHOUT SCIATICA: Primary | ICD-10-CM

## 2018-04-03 DIAGNOSIS — M54.50 CHRONIC BILATERAL LOW BACK PAIN WITHOUT SCIATICA: Primary | ICD-10-CM

## 2018-04-03 DIAGNOSIS — M99.01 CERVICAL SEGMENT DYSFUNCTION: ICD-10-CM

## 2018-04-03 DIAGNOSIS — M99.02 THORACIC SEGMENT DYSFUNCTION: ICD-10-CM

## 2018-04-03 DIAGNOSIS — M99.04 SOMATIC DYSFUNCTION OF SACRAL REGION: ICD-10-CM

## 2018-04-03 PROCEDURE — 98940 CHIROPRACT MANJ 1-2 REGIONS: CPT | Mod: AT | Performed by: CHIROPRACTOR

## 2018-04-03 PROCEDURE — 97810 ACUP 1/> WO ESTIM 1ST 15 MIN: CPT | Mod: GA | Performed by: CHIROPRACTOR
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
## 2018-04-12 ENCOUNTER — THERAPY VISIT (OUTPATIENT)
Dept: CHIROPRACTIC MEDICINE | Facility: CLINIC | Age: 38
End: 2018-04-12
Payer: COMMERCIAL

## 2018-04-12 DIAGNOSIS — M54.50 CHRONIC BILATERAL LOW BACK PAIN WITHOUT SCIATICA: ICD-10-CM

## 2018-04-12 DIAGNOSIS — M99.02 THORACIC SEGMENT DYSFUNCTION: ICD-10-CM

## 2018-04-12 DIAGNOSIS — G89.29 CHRONIC BILATERAL LOW BACK PAIN WITHOUT SCIATICA: ICD-10-CM

## 2018-04-12 DIAGNOSIS — M99.01 CERVICAL SEGMENT DYSFUNCTION: ICD-10-CM

## 2018-04-12 DIAGNOSIS — M54.2 CERVICALGIA: Primary | ICD-10-CM
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
## 2018-04-12 DIAGNOSIS — M99.04 SOMATIC DYSFUNCTION OF SACRAL REGION: ICD-10-CM

## 2018-04-12 PROCEDURE — 97813 ACUP 1/> W/ESTIM 1ST 15 MIN: CPT | Mod: GA | Performed by: CHIROPRACTOR

## 2018-04-12 PROCEDURE — 98941 CHIROPRACT MANJ 3-4 REGIONS: CPT | Mod: AT | Performed by: CHIROPRACTOR
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
## 2018-04-18 ENCOUNTER — THERAPY VISIT (OUTPATIENT)
Dept: CHIROPRACTIC MEDICINE | Facility: CLINIC | Age: 38
End: 2018-04-18
Payer: COMMERCIAL

## 2018-04-18 DIAGNOSIS — M54.2 CERVICALGIA: ICD-10-CM

## 2018-04-18 DIAGNOSIS — M99.01 CERVICAL SEGMENT DYSFUNCTION: ICD-10-CM

## 2018-04-18 DIAGNOSIS — M54.50 CHRONIC BILATERAL LOW BACK PAIN WITHOUT SCIATICA: Primary | ICD-10-CM

## 2018-04-18 DIAGNOSIS — G89.29 CHRONIC BILATERAL LOW BACK PAIN WITHOUT SCIATICA: Primary | ICD-10-CM

## 2018-04-18 DIAGNOSIS — M99.04 SOMATIC DYSFUNCTION OF SACRAL REGION: ICD-10-CM
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
## 2018-04-18 DIAGNOSIS — M99.02 THORACIC SEGMENT DYSFUNCTION: ICD-10-CM

## 2018-04-18 PROCEDURE — 98941 CHIROPRACT MANJ 3-4 REGIONS: CPT | Mod: AT | Performed by: CHIROPRACTOR

## 2018-04-18 PROCEDURE — 97810 ACUP 1/> WO ESTIM 1ST 15 MIN: CPT | Mod: GA | Performed by: CHIROPRACTOR
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
## 2018-04-26 ENCOUNTER — THERAPY VISIT (OUTPATIENT)
Dept: CHIROPRACTIC MEDICINE | Facility: CLINIC | Age: 38
End: 2018-04-26
Payer: COMMERCIAL

## 2018-04-26 DIAGNOSIS — G89.29 CHRONIC BILATERAL LOW BACK PAIN WITHOUT SCIATICA: ICD-10-CM

## 2018-04-26 DIAGNOSIS — M99.02 THORACIC SEGMENT DYSFUNCTION: ICD-10-CM

## 2018-04-26 DIAGNOSIS — M54.2 CERVICALGIA: Primary | ICD-10-CM

## 2018-04-26 DIAGNOSIS — M54.50 CHRONIC BILATERAL LOW BACK PAIN WITHOUT SCIATICA: ICD-10-CM
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
## 2018-04-26 DIAGNOSIS — M99.01 CERVICAL SEGMENT DYSFUNCTION: ICD-10-CM

## 2018-04-26 DIAGNOSIS — M99.04 SOMATIC DYSFUNCTION OF SACRAL REGION: ICD-10-CM

## 2018-04-26 PROCEDURE — 98941 CHIROPRACT MANJ 3-4 REGIONS: CPT | Mod: AT | Performed by: CHIROPRACTOR

## 2018-04-26 PROCEDURE — 97813 ACUP 1/> W/ESTIM 1ST 15 MIN: CPT | Mod: GA | Performed by: CHIROPRACTOR
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
## 2018-05-14 ENCOUNTER — THERAPY VISIT (OUTPATIENT)
Dept: CHIROPRACTIC MEDICINE | Facility: CLINIC | Age: 38
End: 2018-05-14
Payer: COMMERCIAL
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
## 2018-05-14 DIAGNOSIS — M54.2 CERVICALGIA: Primary | ICD-10-CM

## 2018-05-14 DIAGNOSIS — M99.01 CERVICAL SEGMENT DYSFUNCTION: ICD-10-CM

## 2018-05-14 DIAGNOSIS — M99.02 THORACIC SEGMENT DYSFUNCTION: ICD-10-CM

## 2018-05-14 PROCEDURE — 98940 CHIROPRACT MANJ 1-2 REGIONS: CPT | Mod: AT | Performed by: CHIROPRACTOR

## 2018-05-14 PROCEDURE — 97110 THERAPEUTIC EXERCISES: CPT | Performed by: CHIROPRACTOR

## 2018-05-14 PROCEDURE — 97810 ACUP 1/> WO ESTIM 1ST 15 MIN: CPT | Mod: GA | Performed by: CHIROPRACTOR
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
## 2018-05-24 ENCOUNTER — THERAPY VISIT (OUTPATIENT)
Dept: CHIROPRACTIC MEDICINE | Facility: CLINIC | Age: 38
End: 2018-05-24
Payer: COMMERCIAL

## 2018-05-24 DIAGNOSIS — M99.04 SOMATIC DYSFUNCTION OF SACRAL REGION: ICD-10-CM
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
## 2018-05-24 DIAGNOSIS — G89.29 CHRONIC BILATERAL LOW BACK PAIN WITHOUT SCIATICA: Primary | ICD-10-CM

## 2018-05-24 DIAGNOSIS — M54.50 CHRONIC BILATERAL LOW BACK PAIN WITHOUT SCIATICA: Primary | ICD-10-CM

## 2018-05-24 DIAGNOSIS — M99.01 CERVICAL SEGMENT DYSFUNCTION: ICD-10-CM

## 2018-05-24 DIAGNOSIS — M99.02 THORACIC SEGMENT DYSFUNCTION: ICD-10-CM

## 2018-05-24 DIAGNOSIS — M54.2 CERVICALGIA: ICD-10-CM

## 2018-05-24 PROCEDURE — 98940 CHIROPRACT MANJ 1-2 REGIONS: CPT | Mod: AT | Performed by: CHIROPRACTOR

## 2018-05-24 PROCEDURE — 97810 ACUP 1/> WO ESTIM 1ST 15 MIN: CPT | Mod: GA | Performed by: CHIROPRACTOR
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
## 2018-06-22 ENCOUNTER — THERAPY VISIT (OUTPATIENT)
Dept: CHIROPRACTIC MEDICINE | Facility: CLINIC | Age: 38
End: 2018-06-22
Payer: COMMERCIAL

## 2018-06-22 DIAGNOSIS — M99.04 SOMATIC DYSFUNCTION OF SACRAL REGION: ICD-10-CM

## 2018-06-22 DIAGNOSIS — M99.01 CERVICAL SEGMENT DYSFUNCTION: ICD-10-CM

## 2018-06-22 DIAGNOSIS — M99.02 THORACIC SEGMENT DYSFUNCTION: ICD-10-CM

## 2018-06-22 DIAGNOSIS — M54.2 CERVICALGIA: Primary | ICD-10-CM

## 2018-06-22 PROCEDURE — 98940 CHIROPRACT MANJ 1-2 REGIONS: CPT | Mod: AT | Performed by: CHIROPRACTOR

## 2018-06-22 PROCEDURE — 97810 ACUP 1/> WO ESTIM 1ST 15 MIN: CPT | Mod: GA | Performed by: CHIROPRACTOR
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
## 2018-08-30 ENCOUNTER — TELEPHONE (OUTPATIENT)
Dept: PALLIATIVE MEDICINE | Facility: CLINIC | Age: 38
End: 2018-08-30
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
## 2018-08-30 NOTE — TELEPHONE ENCOUNTER
Pain Management Center Referral      1. Confirmed address with patient? Yes  2. Confirmed phone number with patient? Yes  3. Confirmed referring provider? Yes  4. Is the PCP the same as the referring provider? Yes  5. Has the patient been to any previous pain clinics? Yes  (If yes, send GUSTAVO with welcome letter)  6. Which insurance are we to bill for this appointment?  [employer]    7. Informed pt of cancellation (48 hour) policy? Yes    REGARDING OPIOID MEDICATIONS: We will always address appropriateness of opioid pain medications, but we generally will not automatically take on a prescribing role. When we do take on prescribing of opioids for chronic pain, it is in collaboration with the referring physician for an intermediate period of time (months), with an expectation that the primary physician or provider will assume the prescribing role if medications are effective at stable doses with demonstrated compliance. Therefore, please do not assume that your prescribing responsibilities end on the day of pain clinic consultation.  7. Informed pt of prescribing policy? Yes      8. Referring Provider: Clinic, [facility] [place] Schmidit, Nancy    9. Criteria for Triage Magali ESCOTO    [facility]     -Missed/Failed 1st DUAL appointment? N/A   -Medication Focused? N/A   -Mental Health Concerns? (e.g. Recent psych hospitalization/snap shot)? N/A   -Active substance abuse? N/A   -Patient behaviors (e.g. Offensive language/raised voice)? N/A

## 2018-08-31 ENCOUNTER — OFFICE VISIT (OUTPATIENT)
Dept: PALLIATIVE MEDICINE | Facility: CLINIC | Age: 38
End: 2018-08-31
Attending: NURSE PRACTITIONER
Payer: COMMERCIAL

## 2018-08-31 VITALS
OXYGEN SATURATION: 99 % | DIASTOLIC BLOOD PRESSURE: 88 MMHG | HEART RATE: 71 BPM | SYSTOLIC BLOOD PRESSURE: 148 MMHG | BODY MASS INDEX: 25.7 KG/M2 | WEIGHT: 169 LBS

## 2018-08-31 DIAGNOSIS — M54.12 CERVICAL RADICULOPATHY: Primary | ICD-10-CM

## 2018-08-31 PROCEDURE — 99205 OFFICE O/P NEW HI 60 MIN: CPT | Performed by: NURSE PRACTITIONER

## 2018-08-31 RX ORDER — DULOXETIN HYDROCHLORIDE 30 MG/1
60 CAPSULE, DELAYED RELEASE ORAL AT BEDTIME
COMMUNITY
End: 2019-02-08

## 2018-08-31 ASSESSMENT — PAIN SCALES - GENERAL: PAINLEVEL: SEVERE PAIN (6)

## 2018-08-31 NOTE — PATIENT INSTRUCTIONS
1.  Pain Physical Therapy:  YES   Schedule first visit with HITESH Madden and try to have 2-3 sessions prior to next visit.   2.  Pain Psychologist to address relaxation, behavioral change, coping style, and other factors important to improvement.  YES  Got to Employee Assistance visit as planned, can discuss Rebekah ([facility]) at next visit.   3.  Medication Management:     1. Lets try an increase in gabapentin. Increase as directed below. Call with issues.   Gabapentin 1 tab= 100mg  AM   PM   Bedtime  0   0   200mg (2 tabs).  After 3-5 days, increase as tolerated   100mg (1 tab)  0   200mg (2 tabs).  After 3-5 days, increase as tolerated   100mg (1 tab)  100mg (1 tab)  200mg (2 tabs).  After 3-5 days, increase as tolerated   200mg (2 tabs)  100mg (1 tab)  200mg (2 tabs). After 3-5 days, increase as tolerated   200mg (2 tabs)  200mg (2 tabs)  200mg (2 tabs). After 3-5 days, increase as tolerated   200mg (2 tabs)  200mg (2 tabs)  300mg (3 tabs). After 3-5 days, increase as tolerated   300mg (3 tabs)  200mg (2 tabs)  300mg (3 tabs). After 3-5 days, increase as tolerated   300mg (3 tabs)  300mg (3 tabs)  300mg (3 tabs).   Call with any problems.  Caution for sedation.    Do not drive until you know how the medication affects you.   You can go slower if you need to or increasing only one dose at a time.  Do not stop abruptly once at higher doses.  This medication must be tapered off.     2. Call/MyChart your primary care provider regarding Celexa. I would recommend tapering off of Cymbalta and increasing back to normal dosing of Celexa.    4.  Potential procedures: could discuss in the future.     5.  Follow up with KRAIG Holt CNP in 4 weeks.       ----------------------------------------------------------------  Clinic Number:  [phone]   Call this number with any questions about your care and for scheduling assistance. Calls are returned Monday through Friday between 8 AM and 4:30 PM. We  usually get back to you within 2 business days depending on the issue/request.       Medication refills:    For non-narcotic medications, call your pharmacy directly to request a refill. The pharmacy will contact the Pain Management Center for authorization. Please allow 3-4 days for these refills to be processed.     For narcotic refills, call the nurse triage line or send a [email or web address] message. Please contact us 7-10 days before your refill is due. The message MUST include the name of the specific medication(s) requested and how you would like to receive the prescription(s). The options are as follows:    Pain Clinic staff can mail the prescription to your pharmacy. Please tell us the name of the pharmacy.    You may pick the prescription up at the Pain Clinic (tell us the location) or during a clinic visit with your pain provider    Pain Clinic staff can deliver the prescription to the [facility] pharmacy in the clinic building. Please tell us the location.      We believe regular attendance is key to your success in our program.    Any time you are unable to keep your appointment we ask that you call us at least 24 hours in advance to let us know. This will allow us to offer the appointment time to another patient.

## 2018-08-31 NOTE — MR AVS SNAPSHOT
After Visit Summary   8/31/2018    Savita Matthews    MRN: [id number]           Patient Information     Date Of Birth          1980        Visit Information        Provider Department      8/31/2018 8:00 AM Jess Nguyễn APRN CNP [facility]        Today's Diagnoses     Cervical radiculopathy    -  1      Care Instructions     1.  Pain Physical Therapy:  YES   Schedule first visit with HITESH Madden and try to have 2-3 sessions prior to next visit.   2.  Pain Psychologist to address relaxation, behavioral change, coping style, and other factors important to improvement.  YES  Got to Employee Assistance visit as planned, can discuss Rebekah ([facility]) at next visit.   3.  Medication Management:     1. Lets try an increase in gabapentin. Increase as directed below. Call with issues.   Gabapentin 1 tab= 100mg  AM   PM   Bedtime  0   0   200mg (2 tabs).  After 3-5 days, increase as tolerated   100mg (1 tab)  0   200mg (2 tabs).  After 3-5 days, increase as tolerated   100mg (1 tab)  100mg (1 tab)  200mg (2 tabs).  After 3-5 days, increase as tolerated   200mg (2 tabs)  100mg (1 tab)  200mg (2 tabs). After 3-5 days, increase as tolerated   200mg (2 tabs)  200mg (2 tabs)  200mg (2 tabs). After 3-5 days, increase as tolerated   200mg (2 tabs)  200mg (2 tabs)  300mg (3 tabs). After 3-5 days, increase as tolerated   300mg (3 tabs)  200mg (2 tabs)  300mg (3 tabs). After 3-5 days, increase as tolerated   300mg (3 tabs)  300mg (3 tabs)  300mg (3 tabs).   Call with any problems.  Caution for sedation.    Do not drive until you know how the medication affects you.   You can go slower if you need to or increasing only one dose at a time.  Do not stop abruptly once at higher doses.  This medication must be tapered off.     2. Call/MyChart your primary care provider regarding Celexa. I would recommend tapering off of Cymbalta and increasing back to normal dosing of Celexa.    4.   Potential procedures: could discuss in the future.     5.  Follow up with KRAIG Holt CNP in 4 weeks.       ----------------------------------------------------------------  Clinic Number:  [phone]   Call this number with any questions about your care and for scheduling assistance. Calls are returned Monday through Friday between 8 AM and 4:30 PM. We usually get back to you within 2 business days depending on the issue/request.       Medication refills:    For non-narcotic medications, call your pharmacy directly to request a refill. The pharmacy will contact the [facility] for authorization. Please allow 3-4 days for these refills to be processed.     For narcotic refills, call the nurse triage line or send a Use It Better message. Please contact us 7-10 days before your refill is due. The message MUST include the name of the specific medication(s) requested and how you would like to receive the prescription(s). The options are as follows:    Pain Clinic staff can mail the prescription to your pharmacy. Please tell us the name of the pharmacy.    You may pick the prescription up at the Pain Clinic (tell us the location) or during a clinic visit with your pain provider    Pain Clinic staff can deliver the prescription to the [facility] pharmacy in the clinic building. Please tell us the location.      We believe regular attendance is key to your success in our program.    Any time you are unable to keep your appointment we ask that you call us at least 24 hours in advance to let us know. This will allow us to offer the appointment time to another patient.               Follow-ups after your visit        Additional Services     PAIN PT EVAL AND TREAT                 Who to contact     If you have questions or need follow up information about today's clinic visit or your schedule please contact [facility] directly at [phone].  Normal or non-critical lab and imaging results will  be communicated to you by Digital Oceanhart, letter or phone within 4 business days after the clinic has received the results. If you do not hear from us within 7 days, please contact the clinic through CombiMatrix or phone. If you have a critical or abnormal lab result, we will notify you by phone as soon as possible.  Submit refill requests through CombiMatrix or call your pharmacy and they will forward the refill request to us. Please allow 3 business days for your refill to be completed.          Additional Information About Your Visit        CombiMatrix Information     CombiMatrix gives you secure access to your electronic health record. If you see a primary care provider, you can also send messages to your care team and make appointments. If you have questions, please call your primary care clinic.  If you do not have a primary care provider, please call [phone] and they will assist you.        Care EveryWhere ID     This is your Care EveryWhere ID. This could be used by other organizations to access your [facility] medical records  [id number]        Your Vitals Were     Pulse Pulse Oximetry BMI (Body Mass Index)             71 99% 25.7 kg/m2          Blood Pressure from Last 3 Encounters:   08/31/18 148/88   02/23/18 135/73   02/06/18 148/65    Weight from Last 3 Encounters:   08/31/18 76.7 kg (169 lb)   02/06/18 74.8 kg (165 lb)   01/31/18 79.7 kg (175 lb 12.8 oz)              We Performed the Following     PAIN PT EVAL AND TREAT          Today's Medication Changes          These changes are accurate as of 8/31/18  8:48 AM.  If you have any questions, ask your nurse or doctor.               Stop taking these medicines if you haven't already. Please contact your care team if you have questions.     BACLOFEN PO   Stopped by:  Jess Nguyễn APRN CNP                    Primary Care Provider Office Phone # Fax #    [facility] [phone] [address] [facility]  [place] [address]         Equal Access to Services     [facility] : Hadii aad ku hadnithinterrance Carmenjuliano, wajoshuada luqadaha, qaybta dipakivethalexandria ward. So [facility] [phone].    ATENCIÓN: Si habla español, tiene a lui disposición servicios gratuitos de asistencia lingüística. Llame al [phone].    We comply with applicable federal civil rights laws and [place] laws. We do not discriminate on the basis of race, color, national origin, age, disability, sex, sexual orientation, or gender identity.            Thank you!     Thank you for choosing [facility]  for your care. Our goal is always to provide you with excellent care. Hearing back from our patients is one way we can continue to improve our services. Please take a few minutes to complete the written survey that you may receive in the mail after your visit with us. Thank you!             Your Updated Medication List - Protect others around you: Learn how to safely use, store and throw away your medicines at www.disposemymeds.org.          This list is accurate as of 8/31/18  8:48 AM.  Always use your most recent med list.                   Brand Name Dispense Instructions for use Diagnosis    ATIVAN PO      Take 0.5 mg by mouth 3 times daily as needed        citalopram 20 MG tablet    celeXA     Take 20 mg by mouth daily        CYMBALTA 30 MG EC capsule   Generic drug:  DULoxetine      Take 60 mg by mouth At Bedtime        GABAPENTIN PO      Take 200 mg by mouth At Bedtime        promethazine 25 MG tablet    PHENERGAN     Take 25 mg by mouth every 6 hours as needed for nausea        RIZATRIPTAN BENZOATE PO      Take 5 mg by mouth once as needed for migraine        TIZANIDINE HCL PO      Take 2 mg by mouth once        TYLENOL PO      Take 500 mg by mouth 2 times daily

## 2018-09-05 ENCOUNTER — HEALTH MAINTENANCE LETTER (OUTPATIENT)
Age: 38
End: 2018-09-05

## 2018-09-05 ENCOUNTER — OFFICE VISIT (OUTPATIENT)
Dept: PALLIATIVE MEDICINE | Facility: CLINIC | Age: 38
End: 2018-09-05
Attending: NURSE PRACTITIONER
Payer: COMMERCIAL

## 2018-09-05 DIAGNOSIS — M54.2 CERVICALGIA: Primary | ICD-10-CM

## 2018-09-05 PROCEDURE — 97530 THERAPEUTIC ACTIVITIES: CPT | Mod: GP | Performed by: PHYSICAL THERAPIST

## 2018-09-05 PROCEDURE — 97162 PT EVAL MOD COMPLEX 30 MIN: CPT | Mod: GP | Performed by: PHYSICAL THERAPIST

## 2018-09-05 NOTE — MR AVS SNAPSHOT
After Visit Summary   9/5/2018    Savita Matthews    MRN: [id number]           Patient Information     Date Of Birth          1980        Visit Information        Provider Department      9/5/2018 2:45 PM Maryanne Sevilla PT [facility]        Today's Diagnoses     Cervicalgia    -  1       Follow-ups after your visit        Your next 10 appointments already scheduled     Sep 28, 2018  2:30 PM CDT   Return Visit with KRAIG Echeverria CNP   [facility] ([facility])    [address] [address] [address]   [phone]              Who to contact     If you have questions or need follow up information about today's clinic visit or your schedule please contact [facility] directly at [phone].  Normal or non-critical lab and imaging results will be communicated to you by MyChart, letter or phone within 4 business days after the clinic has received the results. If you do not hear from us within 7 days, please contact the clinic through MyChart or phone. If you have a critical or abnormal lab result, we will notify you by phone as soon as possible.  Submit refill requests through XillianTV or call your pharmacy and they will forward the refill request to us. Please allow 3 business days for your refill to be completed.          Additional Information About Your Visit        MyChart Information     XillianTV gives you secure access to your electronic health record. If you see a primary care provider, you can also send messages to your care team and make appointments. If you have questions, please call your primary care clinic.  If you do not have a primary care provider, please call [phone] and they will assist you.        Care EveryWhere ID     This is your Care EveryWhere ID. This could be used by other organizations to access your [facility] medical records  [id number]         Blood Pressure from  Last 3 Encounters:   08/31/18 148/88   02/23/18 135/73   02/06/18 148/65    Weight from Last 3 Encounters:   08/31/18 76.7 kg (169 lb)   02/06/18 74.8 kg (165 lb)   01/31/18 79.7 kg (175 lb 12.8 oz)              We Performed the Following     HC PT EVAL, MODERATE COMPLEXITY     THERAPEUTIC ACTIVITIES        Primary Care Provider Office Phone # Fax #    [facility] [phone]       [facility] [facility]  [facility] [address]        Equal Access to Services     [facility]: Hadii aad ku hadasho Soomaali, waaxda luqadaha, qaybta kaalmada adeegyada, alexandria franklin . So [facility] [phone].    ATENCIÓN: Si habla español, tiene a lui disposición servicios gratuitos de asistencia lingüística. Llame al [phone].    We comply with applicable federal civil rights laws and [place] laws. We do not discriminate on the basis of race, color, national origin, age, disability, sex, sexual orientation, or gender identity.            Thank you!     Thank you for choosing [facility]  for your care. Our goal is always to provide you with excellent care. Hearing back from our patients is one way we can continue to improve our services. Please take a few minutes to complete the written survey that you may receive in the mail after your visit with us. Thank you!             Your Updated Medication List - Protect others around you: Learn how to safely use, store and throw away your medicines at www.disposemymeds.org.          This list is accurate as of 9/5/18  3:47 PM.  Always use your most recent med list.                   Brand Name Dispense Instructions for use Diagnosis    ATIVAN PO      Take 0.5 mg by mouth 3 times daily as needed        citalopram 20 MG tablet    celeXA     Take 20 mg by mouth daily        CYMBALTA 30 MG EC capsule   Generic drug:  DULoxetine      Take 60 mg by mouth At Bedtime        GABAPENTIN PO      Take 200 mg by mouth At Bedtime         promethazine 25 MG tablet    PHENERGAN     Take 25 mg by mouth every 6 hours as needed for nausea        RIZATRIPTAN BENZOATE PO      Take 5 mg by mouth once as needed for migraine        TIZANIDINE HCL PO      Take 2 mg by mouth once        TYLENOL PO      Take 500 mg by mouth 2 times daily
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
## 2018-09-27 NOTE — PROGRESS NOTES
[facility]    Date of visit: 9/28/2018    Chief complaint:   Chief Complaint   Patient presents with     Pain       Interval history:  Savita Matthews is a 38 year old female last seen by me on 8/31/18.      Recommendations/plan at the last visit included:                1.  Pain Physical Therapy:  YES   Schedule first visit with HITESH Madden and try to have 2-3 sessions prior to next visit.                        2.  Pain Psychologist to address relaxation, behavioral change, coping style, and other factors important to improvement.  YES  Savita states she has an appointment with an Employee Assistance counselor visit scheduled next week. Recommend she go to this visit as scheduled. She would be interested in meeting with [facility]- discuss at next visit.                        3.  Medication Management:                                               1. Savita notes some benefit with prn nighttime dosing of gabapentin. Would recommend daytime dosing of gabapentin for improved pain control. She is interested in this. Increase as directed below. Call with issues.   Gabapentin 1 tab= 100mg  AM                                                                     PM                                                                     Bedtime  0                                                                         0                                                                         200mg (2 tabs).  After 3-5 days, increase as tolerated   100mg (1 tab)                                          0                                                                         200mg (2 tabs).  After 3-5 days, increase as tolerated   100mg (1 tab)                                          100mg (1 tab)                                          200mg (2 tabs).  After 3-5 days, increase as tolerated   200mg (2 tabs)                                        100mg (1 tab)                                           200mg (2 tabs). After 3-5 days, increase as tolerated   200mg (2 tabs)                                        200mg (2 tabs)                                        200mg (2 tabs). After 3-5 days, increase as tolerated   200mg (2 tabs)                                        200mg (2 tabs)                                        300mg (3 tabs). After 3-5 days, increase as tolerated   300mg (3 tabs)                                        200mg (2 tabs)                                        300mg (3 tabs). After 3-5 days, increase as tolerated   300mg (3 tabs)                                        300mg (3 tabs)                                        300mg (3 tabs).   Call with any problems.  Caution for sedation.    Do not drive until you know how the medication affects you.   You can go slower if you need to or increasing only one dose at a time.  Do not stop abruptly once at higher doses.  This medication must be tapered off.                                              2. Savita notes her anxiety has been worse since starting Cymbalta (early June), found her mental health was better on 20mg of Celexa. As this is prescribed by [facility] primary care provider, advised she call/MyChart to relay this information. I would recommend tapering off of Cymbalta and increasing back to normal dosing of Celexa.                         4.  Potential procedures: could discuss in the future. Potentially could consider trigger point injections vs. cervical medial branch blocks (pain with facet loading on exam).                         5.  Follow up with KRAIG Holt CNP in 4 weeks.                         6.  Patient to follow up with Primary Care provider regarding elevated blood pressure.    Since her last visit, Savita Matthews reports:  -Her pain is about the same/may be somewhat better than it was at last visit.  -She increased her gabapentin as directed. She has been taking 2-300mg in the morning  and 300mg in the evening, often forgets the afternoon dose.  -She feels as though the gabapentin has provided some pain benefit, allowed her to complete more ADLs.  -She contacted her primary care provider regarding her issues with Cymbalta. He agreed in tapering off of Cymbalta and restarting Celexa. She feels much better since this.  -She has had x3 visits with the Employee Assistance Program, states she feels as though has been helpful. Her depression scores have improved. She has x3 sessions remaining.   -She had her first visit with HITESH Madden. She states as she was so sensitive they were limited in how much they were able to do. She wasn't sure if she should return or if that would be helpful.   -She is open to trying a different type of injection. Previous epidural steroid injections were not helpful.   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   -She has noted some weight gain in the last month, about 5-10lbs.     Pain Information:   Pain quality: aching, burning, stabbing    Pain timing: Intermittent     Pain rating: intensity ranges from 4/10 to 8/10, and averages 6/10 on a 0-10 scale.   Aggravating factors include: same position for long   Relieving factors include: moving, heating pads, hot bath, Aleve, pain meds    Current pain treatments:    Gabapentin 300mg BID- [facility] so far, tries to get 100mg in the evening   Celexa 20mg daily- [facility] for mood   Gabapentin 200mg at bedtime- [facility], taking prn with tizanidine   Tizanidine 2mg at bedtime- [facility], hasn't been taking as much lately due to gabapentin    Aleve 440mg BID- [facility]   Maxalt 5mg prn- H      Ativan .5mg TID- H, once daily for panic attacks  Current MME: 0    Annual Controlled Substance Agreement/UDS due date: N/A    Past pain treatments:  1. Previous Pain Relevant Medications:  NOTE: This medication information taken from patient's intake form, not medical records.                          Opiates: Norco- H, Percocet- H, SE, itching                        NSAIDS: ibuprofen- [facility], Aleve- H, meloxicam- H                        Muscle Relaxants: Baclofen- [facility], SE, sedating, Robaxin- ?, Flexeril- SE, sedating, tizanidine- [facility]/H, SE, sedating                        Anti-migraine mediations: no                        Anti-depressants: Cymbalta- W, Celexa- H, Zoloft- allergy                        Sleep aids:no                        Anxiolytics: Ativan- H                         Neuropathics: gabapentin- [facility]/?                                       Topicals: Aspercreme- [facility] short term, Biofreeze- [facility], short term                        Other medications not covered above: Tylenol- [facility], Aspirin- [facility]     2. Physical Therapy: yes land/aquatic physical therapy- [facility]  3. Pain Psychology: no  4. Surgery: no  5. Injections: x2 C7-T1 epidural steroid injections- 1st [facility] for a few days, 2nd [facility], trigger point injections- [facility]  6. Chiropractic: yes- H if goes on a regular basis  7. Acupuncture: yes- H if goes on a regular basis  8. TENS Unit: no    Medications:  Current Outpatient Prescriptions   Medication Sig Dispense Refill     Acetaminophen (TYLENOL PO) Take 500 mg by mouth 2 times daily       citalopram (CELEXA) 20 MG tablet Take 20 mg by mouth daily       DULoxetine (CYMBALTA) 30 MG EC capsule Take 60 mg by mouth At Bedtime       GABAPENTIN PO Take 200 mg by mouth At Bedtime        LORazepam (ATIVAN PO) Take 0.5 mg by mouth 3 times daily as needed        promethazine (PHENERGAN) 25 MG tablet Take 25 mg by mouth every 6 hours as needed for nausea       RIZATRIPTAN BENZOATE PO Take 5 mg by mouth once as needed for migraine       TIZANIDINE HCL PO Take 2 mg by mouth once         Medical History: any changes in medical history since they were last seen? Yes stomach cramping    Review of Systems:  The 14 system ROS was reviewed from the intake questionnaire, and is positive for: weight gain,  "headache, abdominal pain, depression, anxiety       Any bowel or bladder problems: denies  Mood: \"getting better\"    Physical Exam:  Blood pressure 138/81, pulse 77, weight 76.2 kg (168 lb).  General: A&O x4, no signs of distress.  Gait: Normal  MSK exam: 5/5 upper and lower extremity strength. Tenderness to bilateral cervical paraspinals, traps, and rhomboids.     Imaging:  MRI of cervical spine was completed on 1/8/18 (Care Everywhere) and shows:  Findings:  Straightening of the normal cervical ptosis which may be due to muscle spasm or patient positioning.  Otherwise, normal vertebral body facet alignment.  No fractures.  No vertebral body loss of height.  No spondylolisthesis.  No ligamentous injury.  Normal marrow signal.  Normal cord signal.  No intradural mass or lesion.  C1-2:  No spinal canal narrowing.  C2-3:  No spinal canal or neural foraminal narrowing.  C3-4:  No spinal canal or neural foraminal narrowing.  C4-5:  Mild posterior disk bulge with no spinal canal or neural foraminal narrowing.  C5-6:  Left paracentral and subarticular disk protrusion or disc osteophyte complex.  No narrowing of spinal canal.  Mild narrowing of the left neural foramen.  No narrowing of the right neural foramen.  C5-6:  Disc degeneration with a right paracentral subarticular disk protrusion measuring approximately 2 mm in short axis.  No narrowing of spinal canal.  Mild narrowing of the right neural foramen.  No narrowing of the left neuroforamen.  C7-T1:  No spinal canal or neural foraminal narrowing.  No spinal canal or neural foraminal narrowing is visualized upper thoracic spine.    Impression:  1. Straightening of normal cervical lordosis.  Otherwise, normal alignment.  No fractures.  2. Normal cord signal   3. At C5-6, left paracentral and subarticular disk protrusion or disk osteophyte complex. No narrowing of the spinal canal.  Mild narrowing of the left neural foramen   4. At C6-7, right paracentral and " subarticular disk protrusion. No narrowing of spinal canal.  Mild narrowing of the right neural foramen   5. No spinal canal or neural foraminal narrowing at the remaining levels    Assessment:   Savita Matthews is a 38 year old female with a past medical history significant for migraines and anxiety who presents with complaints of neck pain.      1. Neck pain- etiology likely myofascial pain vs. mild foraminal stenosis.  2. Mental Health - the patient's mental health concerns, specifically anxiety, affect her experience of pain and contribute to her clinically significant distress.     1. Myofascial pain    2. Mild single current episode of major depressive disorder (H)        Plan:     1.  Pain Physical Therapy:  YES  We discussed having repeat sessions with pain PT with open communication regarding limitations. She is open to this. Would recommend scheduling follow up sessions.    2.  Pain Psychologist to address relaxation, behavioral change, coping style, and other factors important to improvement.  YES  Would recommend, I will call her when this is available at [facility]. Savita expresses interest in meeting with a psychologist. She has had benefit with EAP so far but notes symptoms of depression, anxiety, and PTSD. Order placed for psychologist today.    3.  Medication Management:     1. Savita reports some benefit with addition of gabapentin. Due to weight gain, we will keep gabapentin at current dose for now. She will monitor her weight twice weekly. If she is still noticing an increase in weight, she will call/MyChart.    4.  Potential procedures: We discussed trigger point injections vs. medial branch blocks (pain with facet loading on exam). We will start with trigger point injections. Ordered today, they will call to schedule.     5.  Follow up with KRAIG Holt CNP in 4-6 weeks.         I spent 30 minutes of time face to face with the patient.  Greater than 50% of this time was spent in  patient counseling and/or coordination of care.    Lashell CORNELL [facility] [place]

## 2018-09-28 ENCOUNTER — OFFICE VISIT (OUTPATIENT)
Dept: PALLIATIVE MEDICINE | Facility: CLINIC | Age: 38
End: 2018-09-28
Payer: COMMERCIAL

## 2018-09-28 VITALS
WEIGHT: 168 LBS | HEART RATE: 77 BPM | SYSTOLIC BLOOD PRESSURE: 138 MMHG | BODY MASS INDEX: 25.54 KG/M2 | DIASTOLIC BLOOD PRESSURE: 81 MMHG

## 2018-09-28 DIAGNOSIS — F32.0 MILD SINGLE CURRENT EPISODE OF MAJOR DEPRESSIVE DISORDER (H): ICD-10-CM

## 2018-09-28 DIAGNOSIS — M79.18 MYOFASCIAL PAIN: Primary | ICD-10-CM

## 2018-09-28 PROCEDURE — 99214 OFFICE O/P EST MOD 30 MIN: CPT | Performed by: NURSE PRACTITIONER

## 2018-09-28 ASSESSMENT — PAIN SCALES - GENERAL: PAINLEVEL: MODERATE PAIN (4)

## 2018-09-28 NOTE — MR AVS SNAPSHOT
After Visit Summary   9/28/2018    Savita Matthews    MRN: [id number]           Patient Information     Date Of Birth          1980        Visit Information        Provider Department      9/28/2018 2:30 PM Jess Nguyễn APRN CNP [facility]        Today's Diagnoses     Myofascial pain    -  1    Mild single current episode of major depressive disorder (H)          Care Instructions     1.  Pain Physical Therapy:  YES   I would recommend additional couple sessions to work with TAMIE Madden    2.  Pain Psychologist to address relaxation, behavioral change, coping style, and other factors important to improvement.  YES  Would recommend, I will call you when this is available. Order placed for psychologist today.    3.  Medication Management:     1. Keep gabapentin at current dose for now. Monitor your weight twice weekly. If you are noticing an increase in weight, call/MyChart.    4.  Potential procedures: I ordered trigger point injections today, they will call to schedule.     5.  Follow up with KRAIG Holt CNP in 4-6 weeks.       ----------------------------------------------------------------  Clinic Number:  [phone]   Call this number with any questions about your care and for scheduling assistance. Calls are returned Monday through Friday between 8 AM and 4:30 PM. We usually get back to you within 2 business days depending on the issue/request.       Medication refills:    For non-narcotic medications, call your pharmacy directly to request a refill. The pharmacy will contact the [facility] for authorization. Please allow 3-4 days for these refills to be processed.     For narcotic refills, call the clinic number or send a [employer] message. Please contact us 7-10 days before your refill is due. The message MUST include the name of the specific medication(s) requested and how you would like to receive the prescription(s). The options are as  follows:    Pain Clinic staff can mail the prescription to your pharmacy. Please tell us the name of the pharmacy.    You may pick the prescription up at the Pain Clinic (tell us the location) or during a clinic visit with your pain provider    Pain Clinic staff can deliver the prescription to the [facility] pharmacy in the clinic building. Please tell us the location.      We believe regular attendance is key to your success in our program.    Any time you are unable to keep your appointment we ask that you call us at least 24 hours in advance to let us know. This will allow us to offer the appointment time to another patient.               Follow-ups after your visit        Additional Services     MENTAL HEALTH REFERRAL  - Adult; Outpatient Treatment; Individual/Couples/Family/Group Therapy/Health Psychology; [facility]: [facility] [phone]; We will contact you to schedule the appointment or please call with any questions       All scheduling is subject to the client's specific insurance plan & benefits, provider/location availability, and provider clinical specialities.  Please arrive 15 minutes early for your first appointment and bring your completed paperwork.    Please be aware that coverage of these services is subject to the terms and limitations of your health insurance plan.  Call member services at your health plan with any benefit or coverage questions.                      PAIN INJECTION EVAL/TREAT/FOLLOW UP                 Who to contact     If you have questions or need follow up information about today's clinic visit or your schedule please contact [facility] directly at [phone].  Normal or non-critical lab and imaging results will be communicated to you by MyChart, letter or phone within 4 business days after the clinic has received the results. If you do not hear from us within 7 days, please contact the clinic through MyChart or phone. If you have a critical or  abnormal lab result, we will notify you by phone as soon as possible.  Submit refill requests through [employer] or call your pharmacy and they will forward the refill request to us. Please allow 3 business days for your refill to be completed.          Additional Information About Your Visit        [employer] Information     [employer] gives you secure access to your electronic health record. If you see a primary care provider, you can also send messages to your care team and make appointments. If you have questions, please call your primary care clinic.  If you do not have a primary care provider, please call [phone] and they will assist you.        Care EveryWhere ID     This is your Care EveryWhere ID. This could be used by other organizations to access your [facility] medical records  [id number]        Your Vitals Were     Pulse BMI (Body Mass Index)                77 25.54 kg/m2           Blood Pressure from Last 3 Encounters:   09/28/18 138/81   08/31/18 148/88   02/23/18 135/73    Weight from Last 3 Encounters:   09/28/18 76.2 kg (168 lb)   08/31/18 76.7 kg (169 lb)   02/06/18 74.8 kg (165 lb)              We Performed the Following     MENTAL HEALTH REFERRAL  - Adult; Outpatient Treatment; Individual/Couples/Family/Group Therapy/Health Psychology; [facility]: [facility] [phone]; We will contact you to schedule the appointment or please call with any questions     PAIN INJECTION EVAL/TREAT/FOLLOW UP        Primary Care Provider Office Phone # Fax #    [facility] [phone]       [facility] [facility] [address]        Equal Access to Services     [facility]: Hadii sruthi kee hadasho Soomaali, waaxda luqadaha, qaybta kaalmada faustino, alexandria braun. So [facility] [phone].    ATENCIÓN: Si habla español, tiene a lui disposición servicios gratuitos de asistencia lingüística. Llame al [phone].    We comply with applicable  federal civil rights laws and [place] laws. We do not discriminate on the basis of race, color, national origin, age, disability, sex, sexual orientation, or gender identity.            Thank you!     Thank you for choosing [facility]  for your care. Our goal is always to provide you with excellent care. Hearing back from our patients is one way we can continue to improve our services. Please take a few minutes to complete the written survey that you may receive in the mail after your visit with us. Thank you!             Your Updated Medication List - Protect others around you: Learn how to safely use, store and throw away your medicines at www.disposemymeds.org.          This list is accurate as of 9/28/18  2:43 PM.  Always use your most recent med list.                   Brand Name Dispense Instructions for use Diagnosis    ATIVAN PO      Take 0.5 mg by mouth 3 times daily as needed        citalopram 20 MG tablet    celeXA     Take 20 mg by mouth daily        CYMBALTA 30 MG EC capsule   Generic drug:  DULoxetine      Take 60 mg by mouth At Bedtime        GABAPENTIN PO      Take 200 mg by mouth At Bedtime        promethazine 25 MG tablet    PHENERGAN     Take 25 mg by mouth every 6 hours as needed for nausea        RIZATRIPTAN BENZOATE PO      Take 5 mg by mouth once as needed for migraine        TIZANIDINE HCL PO      Take 2 mg by mouth once        TYLENOL PO      Take 500 mg by mouth 2 times daily

## 2018-10-03 ENCOUNTER — OFFICE VISIT (OUTPATIENT)
Dept: PALLIATIVE MEDICINE | Facility: CLINIC | Age: 38
End: 2018-10-03
Payer: COMMERCIAL

## 2018-10-03 DIAGNOSIS — M54.2 CERVICALGIA: Primary | ICD-10-CM

## 2018-10-03 PROCEDURE — 97530 THERAPEUTIC ACTIVITIES: CPT | Mod: GP | Performed by: PHYSICAL THERAPIST

## 2018-10-03 NOTE — PROGRESS NOTES
PAIN PHYSICAL THERAPY PROGRESS NOTE  Patient Name: Savita Matthews      YOB: 1980     Medical Record Number: [id number]  Diagnosis: Cervicalgia    Visit: 2/13    Subjective: Patient reports primary complaint of bilateral neck pain and right shoulder pain; no tingling in fingers.  Pt reports feeling apprehensive with light touch to mid back/ribs.  Prefers firm touch/palpation.    Self Care  HEP: begin today  Walking/Aerobic Activity: 20' x 2-3x/wk; resume aquatic exercise start with 5' and progress gradually  Posture: indep  Breathing/Relaxation: issued CD  Heat/Ice: heating pad, issued rice sock  Mini Breaks: issued handout  Pacing: issued handout      Objective Findings:  OBSERVATION: Patient demonstrates forward head, protracted shoulders and thoracic kyphosis in standing and sitting posture.  Pt appears less anxious and guarded with movements today.  Reviewed supine rolling chest with arms long. Added following with eyes/head  Added supine rolling pelvis with legs bent and tilting knees.    Noted holding breath and anticipation pain with rolling pelvis to the left.      Treatment Interventions:  Therapeutic Activity:   For 45 minutes as above.  __________________________________________________________________  Instruction on home program: above    Assessment:  Ongoing Functional Limitations Include:  Patient tolerated/responded well to treatment    Intensity Level: 3 (1=low intensity; 5=high intensity)  Demonstrates/Verbalizes Technique: 5 (1= poor technique-difficulty performing exercises,significant cues required; 5= good technique-performs exercises without cues)  Body Awareness: 3 (1=low awareness; 5=high awareness)  Posture/Stability: 3 (1= poor posture, stability; 5= good posture, stability)  Motivational Level: Cooperative  Response to Teaching: cooperative  Factors that affect learning: None    _______________________________________________________________________  Plan of Care  Continue  PT to support reactivation and integration of self regulation pain management skills;  Continue with prescribed plan of care - progress as tolerated.  Focus next session will be on: add beginning spinal rotation, consider manual Ctx or right brachial plexus manipulation     Present:  NA     Total Visit Time:  45 minutes    Therapist: Maryanne Sevilla PT             Date: 10/3/2018

## 2018-10-03 NOTE — MR AVS SNAPSHOT
After Visit Summary   10/3/2018    Savita Matthews    MRN: [id number]           Patient Information     Date Of Birth          1980        Visit Information        Provider Department      10/3/2018 2:45 PM Maryanne Sevilla PT [facility]        Today's Diagnoses     Cervicalgia    -  1       Follow-ups after your visit        Your next 10 appointments already scheduled     Oct 04, 2018  2:30 PM CDT   PROCEDURE with KRAIG Echeverria CNP   [facility] ([facility])    [address] [place] [address]   [phone]            Oct 11, 2018  2:45 PM CDT   Return Visit with Maryanne Sevilla PT   [facility] ([facility])    [address] [address] [address]   [phone]            Nov 02, 2018  3:00 PM CDT   Return Visit with KRAIG Echeverria CNP   [facility] ([facility])    [address] [address] [address]   [phone]              Who to contact     If you have questions or need follow up information about today's clinic visit or your schedule please contact [facility] directly at [phone].  Normal or non-critical lab and imaging results will be communicated to you by Joturlhart, letter or phone within 4 business days after the clinic has received the results. If you do not hear from us within 7 days, please contact the clinic through Joturlhart or phone. If you have a critical or abnormal lab result, we will notify you by phone as soon as possible.  Submit refill requests through [employer] or call your pharmacy and they will forward the refill request to us. Please allow 3 business days for your refill to be completed.          Additional Information About Your Visit        JoturlharRamamia Information     [employer] gives you secure access to your electronic health record. If you  see a primary care provider, you can also send messages to your care team and make appointments. If you have questions, please call your primary care clinic.  If you do not have a primary care provider, please call [phone] and they will assist you.        Care EveryWhere ID     This is your Care EveryWhere ID. This could be used by other organizations to access your [facility] medical records  [id number]         Blood Pressure from Last 3 Encounters:   09/28/18 138/81   08/31/18 148/88   02/23/18 135/73    Weight from Last 3 Encounters:   09/28/18 76.2 kg (168 lb)   08/31/18 76.7 kg (169 lb)   02/06/18 74.8 kg (165 lb)              We Performed the Following     THERAPEUTIC ACTIVITIES        Primary Care Provider Office Phone # Fax #    [facility] [phone] [address] [facility].  [facility] [address]        Equal Access to Services     GEO [place] : Hadii aad ku hadasho Soomaali, waaxda luqadaha, qaybta kaalmada adeegyada, waxay idiin haylishan carli franklin . So [facility] [phone].    ATENCIÓN: Si habla español, tiene a lui disposición servicios gratuitos de asistencia lingüística. Llame al [phone].    We comply with applicable federal civil rights laws and [place] laws. We do not discriminate on the basis of race, color, national origin, age, disability, sex, sexual orientation, or gender identity.            Thank you!     Thank you for choosing [facility]  for your care. Our goal is always to provide you with excellent care. Hearing back from our patients is one way we can continue to improve our services. Please take a few minutes to complete the written survey that you may receive in the mail after your visit with us. Thank you!             Your Updated Medication List - Protect others around you: Learn how to safely use, store and throw away your medicines at www.disposemymeds.org.          This list is accurate as of 10/3/18  3:44 PM.   Always use your most recent med list.                   Brand Name Dispense Instructions for use Diagnosis    ATIVAN PO      Take 0.5 mg by mouth 3 times daily as needed        citalopram 20 MG tablet    celeXA     Take 20 mg by mouth daily        CYMBALTA 30 MG EC capsule   Generic drug:  DULoxetine      Take 60 mg by mouth At Bedtime        GABAPENTIN PO      Take 200 mg by mouth At Bedtime        promethazine 25 MG tablet    PHENERGAN     Take 25 mg by mouth every 6 hours as needed for nausea        RIZATRIPTAN BENZOATE PO      Take 5 mg by mouth once as needed for migraine        TIZANIDINE HCL PO      Take 2 mg by mouth once        TYLENOL PO      Take 500 mg by mouth 2 times daily

## 2018-10-03 NOTE — PROGRESS NOTES
[facility] - Procedure Note    Date of Visit: 10/4/2018    Pre procedure Diagnosis: myofascial pain/myositis 60.9   Post procedure Diagnosis: Same  Procedure performed: trigger point injections  Complications: none  Operators: Lashell CORNELL CNP    BP (!) 141/91  Pulse 74  Wt 76.2 kg (168 lb)  SpO2 99%  BMI 25.54 kg/m2    Indications:   Savita Matthews is a 38 year old female with a history of myofascial pain.  Exam shows myofascial pain of the muscle groups listed below and they have tried conservative treatment including PT and medications.    Options/alternatives, benefits and risks were discussed with the patient including bleeding, infection, tissue trauma and pnuemothorax.  Questions were answered to her satisfaction and she agrees to proceed. Voluntary informed consent was obtained and signed.     Vitals allergies and medications were reviewed.    Procedure:  After getting informed consent, a Pause for the Cause was performed.    Trigger points were identified by patient, and marked when appropriate.  The area was prepped with Chloroprep.    Using clean technique, injections were completed using a 25G, 1.5 inch needle.  After negative aspiration, injection was completed.  A total of 7 locations were injected.  When possible, tissue was retracted from the chest wall to avoid lung injury.    Muscle groups injected:  Bilateral trapezius, splenius capitis, levator scapulae, and right thoracic paraspinals     Injection solution contained:  10ml of 0.5% bupivacaine     Hemostasis was achieved, the area was cleaned, and bandaids were placed when appropriate.  The patient tolerated the procedure well.  Breath sounds were normal.      Follow-up includes:   -f/u with the referring provider  -repeat as needed    Patient to follow up with Primary Care provider regarding elevated blood pressure.      Lashell CORNELL CNP  [facility]

## 2018-10-04 ENCOUNTER — OFFICE VISIT (OUTPATIENT)
Dept: PALLIATIVE MEDICINE | Facility: CLINIC | Age: 38
End: 2018-10-04
Attending: NURSE PRACTITIONER
Payer: COMMERCIAL

## 2018-10-04 VITALS
BODY MASS INDEX: 25.54 KG/M2 | DIASTOLIC BLOOD PRESSURE: 91 MMHG | HEART RATE: 74 BPM | OXYGEN SATURATION: 99 % | SYSTOLIC BLOOD PRESSURE: 141 MMHG | WEIGHT: 168 LBS

## 2018-10-04 DIAGNOSIS — M79.18 MYOFASCIAL PAIN: Primary | ICD-10-CM

## 2018-10-04 PROCEDURE — 20553 NJX 1/MLT TRIGGER POINTS 3/>: CPT | Performed by: NURSE PRACTITIONER

## 2018-10-04 ASSESSMENT — PAIN SCALES - GENERAL: PAINLEVEL: SEVERE PAIN (7)

## 2018-10-04 NOTE — PATIENT INSTRUCTIONS
[facility]   Post Procedure Instructions    Today you had:  trigger point injections      Medications used: bupivicaine          Go to the emergency room if you develop any shortness of breath    Monitor the injection sites for signs and symptoms of infection-fever, chills, redness, swelling, warmth, or drainage to areas.    You may have soreness at injection sites for up to 24 hours.    You may apply ice to the painful areas to help minimize the discomfort of the needle pokes.    Do not apply heat to sites for at least 12 hours.    You may use anti-inflammatory medications or Tylenol for pain control if necessary    Pain Clinic phone number during work hours Monday-Friday:  [phone]    After hours provider line: [phone]

## 2018-10-04 NOTE — MR AVS SNAPSHOT
After Visit Summary   10/4/2018    Savita Matthews    MRN: [id number]           Patient Information     Date Of Birth          1980        Visit Information        Provider Department      10/4/2018 2:30 PM Jess Nguyễn APRN CNP [facility]        Care Instructions    [facility]   Post Procedure Instructions    Today you had:  trigger point injections      Medications used: bupivicaine          Go to the emergency room if you develop any shortness of breath    Monitor the injection sites for signs and symptoms of infection-fever, chills, redness, swelling, warmth, or drainage to areas.    You may have soreness at injection sites for up to 24 hours.    You may apply ice to the painful areas to help minimize the discomfort of the needle pokes.    Do not apply heat to sites for at least 12 hours.    You may use anti-inflammatory medications or Tylenol for pain control if necessary    Pain Clinic phone number during work hours Monday-Friday:  [phone]    After hours provider line: [phone]                Follow-ups after your visit        Your next 10 appointments already scheduled     Oct 04, 2018  2:30 PM CDT   PROCEDURE with KRAIG Echeverria CNP   [facility] ([facility])    [address] [place] [address]   [phone]            Oct 11, 2018  2:45 PM CDT   Return Visit with Maryanne Sevilla PT   [facility] ([facility])    [address] [place] [address]   [phone]            Nov 02, 2018  3:00 PM CDT   Return Visit with KRAIG Echeverria CNP   [facility] ([facility])    [address] [place] [address]   [phone]              Who to contact     If you have questions or need follow up information about today's clinic visit or your  schedule please contact [facility] directly at [phone].  Normal or non-critical lab and imaging results will be communicated to you by MyChart, letter or phone within 4 business days after the clinic has received the results. If you do not hear from us within 7 days, please contact the clinic through MyChart or phone. If you have a critical or abnormal lab result, we will notify you by phone as soon as possible.  Submit refill requests through [employer] or call your pharmacy and they will forward the refill request to us. Please allow 3 business days for your refill to be completed.          Additional Information About Your Visit        [employer] Information     [employer] gives you secure access to your electronic health record. If you see a primary care provider, you can also send messages to your care team and make appointments. If you have questions, please call your primary care clinic.  If you do not have a primary care provider, please call [phone] and they will assist you.        Care EveryWhere ID     This is your Care EveryWhere ID. This could be used by other organizations to access your [facility] medical records  [id number]        Your Vitals Were     Pulse Pulse Oximetry BMI (Body Mass Index)             74 99% 25.54 kg/m2          Blood Pressure from Last 3 Encounters:   10/04/18 (!) 141/91   09/28/18 138/81   08/31/18 148/88    Weight from Last 3 Encounters:   10/04/18 76.2 kg (168 lb)   09/28/18 76.2 kg (168 lb)   08/31/18 76.7 kg (169 lb)              Today, you had the following     No orders found for display       Primary Care Provider Office Phone # Fax #    [facility] [phone] [address] [facility].  [facility] [address]        Equal Access to Services     HOA [place] : Jess Miranda, ghada oliva, santos harmon, alexandria braun. So [facility] [phone].    ATENCIÓN: Desi dooley  español, tiene a lui disposición servicios gratuitos de asistencia lingüística. Abbie zurita [phone].    We comply with applicable federal civil rights laws and [place] laws. We do not discriminate on the basis of race, color, national origin, age, disability, sex, sexual orientation, or gender identity.            Thank you!     Thank you for choosing [facility]  for your care. Our goal is always to provide you with excellent care. Hearing back from our patients is one way we can continue to improve our services. Please take a few minutes to complete the written survey that you may receive in the mail after your visit with us. Thank you!             Your Updated Medication List - Protect others around you: Learn how to safely use, store and throw away your medicines at www.disposemymeds.org.          This list is accurate as of 10/4/18  2:23 PM.  Always use your most recent med list.                   Brand Name Dispense Instructions for use Diagnosis    ATIVAN PO      Take 0.5 mg by mouth 3 times daily as needed        citalopram 20 MG tablet    celeXA     Take 20 mg by mouth daily        CYMBALTA 30 MG EC capsule   Generic drug:  DULoxetine      Take 60 mg by mouth At Bedtime        GABAPENTIN PO      Take 200 mg by mouth At Bedtime        promethazine 25 MG tablet    PHENERGAN     Take 25 mg by mouth every 6 hours as needed for nausea        RIZATRIPTAN BENZOATE PO      Take 5 mg by mouth once as needed for migraine        TIZANIDINE HCL PO      Take 2 mg by mouth once        TYLENOL PO      Take 500 mg by mouth 2 times daily

## 2018-10-05 ENCOUNTER — MYC MEDICAL ADVICE (OUTPATIENT)
Dept: PALLIATIVE MEDICINE | Facility: CLINIC | Age: 38
End: 2018-10-05

## 2018-10-05 NOTE — TELEPHONE ENCOUNTER
My chart below sent to patient. Will leave encounter open for patient response/chart review by nursing.    Cooper Barney,     I talked with your provider about your message.  Sorry you were not able to make that appointment through [facility].  At this point, you have 2 options.  You could choose a new  [facility] primary care and schedule psychology through [facility].  But, it seems that is not likely what you would like to do.  It is important to have a good relationship with your primary care provider as it seems you do.  I would recommend that you call you primary care provider and let them know that you were unable to schedule through [facility].  Ask them to place and order for mental health/psychology through [facility].  This way you do have access to a psychologist.   Thanks!     Theresa GLYNNN, RN Care Coordinator  [facility]

## 2018-10-11 ENCOUNTER — OFFICE VISIT (OUTPATIENT)
Dept: PALLIATIVE MEDICINE | Facility: CLINIC | Age: 38
End: 2018-10-11
Payer: COMMERCIAL

## 2018-10-11 DIAGNOSIS — M54.2 CERVICALGIA: Primary | ICD-10-CM

## 2018-10-11 PROCEDURE — 97112 NEUROMUSCULAR REEDUCATION: CPT | Mod: GP | Performed by: PHYSICAL THERAPIST

## 2018-10-11 NOTE — MR AVS SNAPSHOT
After Visit Summary   10/11/2018    Savita Matthews    MRN: [id number]           Patient Information     Date Of Birth          1980        Visit Information        Provider Department      10/11/2018 2:45 PM Maryanne Sevilla PT [facility]        Today's Diagnoses     Cervicalgia    -  1       Follow-ups after your visit        Your next 10 appointments already scheduled     Oct 19, 2018  2:30 PM CDT   Return Visit with Maryanne Sevilla PT   [facility] ([facility])    [address] [address] [address]   [phone]            Nov 02, 2018  3:00 PM CDT   Return Visit with KRAIG Echeverria CNP   [facility] ([facility])    [address] [address] [address]   [phone]              Who to contact     If you have questions or need follow up information about today's clinic visit or your schedule please contact [facility] directly at [phone].  Normal or non-critical lab and imaging results will be communicated to you by BioNitrogenhart, letter or phone within 4 business days after the clinic has received the results. If you do not hear from us within 7 days, please contact the clinic through [employer] or phone. If you have a critical or abnormal lab result, we will notify you by phone as soon as possible.  Submit refill requests through [employer] or call your pharmacy and they will forward the refill request to us. Please allow 3 business days for your refill to be completed.          Additional Information About Your Visit        BioN[employer]hart Information     [employer] gives you secure access to your electronic health record. If you see a primary care provider, you can also send messages to your care team and make appointments. If you have questions, please call your primary care clinic.  If you do not have a primary care provider, please call  [phone] and they will assist you.        Care EveryWhere ID     This is your Care EveryWhere ID. This could be used by other organizations to access your [facility] medical records  [id number]         Blood Pressure from Last 3 Encounters:   10/04/18 (!) 141/91   09/28/18 138/81   08/31/18 148/88    Weight from Last 3 Encounters:   10/04/18 76.2 kg (168 lb)   09/28/18 76.2 kg (168 lb)   08/31/18 76.7 kg (169 lb)              We Performed the Following     NEUROMUSCULAR RE-EDUCATION        Primary Care Provider Office Phone # Fax #    [facility] [phone] [address] [facility].  [facility] [address]        Equal Access to Services     [facility]: Hadii aad chilango hadasho Soomaali, waaxda luqadaha, qaybta kaalmada adeegyada, alexandria franklin . So [facility] [phone].    ATENCIÓN: Si habla español, tiene a lui disposición servicios gratuitos de asistencia lingüística. Abbie al [phone].    We comply with applicable federal civil rights laws and [place] laws. We do not discriminate on the basis of race, color, national origin, age, disability, sex, sexual orientation, or gender identity.            Thank you!     Thank you for choosing [facility]  for your care. Our goal is always to provide you with excellent care. Hearing back from our patients is one way we can continue to improve our services. Please take a few minutes to complete the written survey that you may receive in the mail after your visit with us. Thank you!             Your Updated Medication List - Protect others around you: Learn how to safely use, store and throw away your medicines at www.disposemymeds.org.          This list is accurate as of 10/11/18  3:41 PM.  Always use your most recent med list.                   Brand Name Dispense Instructions for use Diagnosis    ATIVAN PO      Take 0.5 mg by mouth 3 times daily as needed        citalopram 20 MG tablet     celeXA     Take 20 mg by mouth daily        CYMBALTA 30 MG EC capsule   Generic drug:  DULoxetine      Take 60 mg by mouth At Bedtime        GABAPENTIN PO      Take 200 mg by mouth At Bedtime        promethazine 25 MG tablet    PHENERGAN     Take 25 mg by mouth every 6 hours as needed for nausea        RIZATRIPTAN BENZOATE PO      Take 5 mg by mouth once as needed for migraine        TIZANIDINE HCL PO      Take 2 mg by mouth once        TYLENOL PO      Take 500 mg by mouth 2 times daily

## 2018-10-11 NOTE — PROGRESS NOTES
PAIN PHYSICAL THERAPY PROGRESS NOTE  Patient Name: Savita Matthews      YOB: 1980     Medical Record Number: [id number]  Diagnosis: Cervicalgia    Visit: 3/13    Subjective: Patient reports my back doesn't like weather changes.  Notes feeling better since TPI last week.  Reports pain 6/10 level at base of neck.  Able to paint on canvas for 7 hours.  Aware of empathetic tendencies; aware of relationship between mood and pain     Self Care  HEP: indep  Walking/Aerobic Activity: 20' x2-3x/wk; resume aquatic exercise start with 5' and progress gradually  Posture: indep  Breathing/Relaxation: issued CD  Heat/Ice: heating pad, rice sock  Mini Breaks: indep  Pacing: indep      Objective Findings:  OBSERVATION: Patient demonstrates forward head, protracted shoulders and thoracic kyphosis in standing and sitting posture.   Cuing to avoid LE adduction in sitting and supine.  Performed supine right side brachial plexus neural manipulation. Tolerated fair.  Added gentle supine Ctx which patient tolerated better.  Noted increased spasm in right upper back and relieved with supine self hug        Treatment Interventions:  Neuromuscular Reeducation:   For 45 minutes as above.  __________________________________________________________________  Instruction on home program: rolling chest, rolling pelvis, self hug    Assessment:  Ongoing Functional Limitations Include:  Patient tolerated/responded well to treatment    Intensity Level: 3 (1=low intensity; 5=high intensity)  Demonstrates/Verbalizes Technique: 5 (1= poor technique-difficulty performing exercises,significant cues required; 5= good technique-performs exercises without cues)  Body Awareness: 3 (1=low awareness; 5=high awareness)  Posture/Stability: 3 (1= poor posture, stability; 5= good posture, stability)  Motivational Level: Cooperative  Response to Teaching: cooperative  Factors that affect learning:  None    _______________________________________________________________________  Plan of Care  Continue PT to support reactivation and integration of self regulation pain management skills;  Continue with prescribed plan of care - progress as tolerated.  Focus next session will be on: add beginning spinal rotation; continue NM as tolerated     Present:  NA     Total Visit Time:  45 minutes    Therapist: Maryanne Sevilla PT             Date: 10/11/2018

## 2018-10-19 ENCOUNTER — OFFICE VISIT (OUTPATIENT)
Dept: PALLIATIVE MEDICINE | Facility: CLINIC | Age: 38
End: 2018-10-19
Payer: COMMERCIAL

## 2018-10-19 DIAGNOSIS — M54.2 CERVICALGIA: Primary | ICD-10-CM

## 2018-10-19 PROCEDURE — 97112 NEUROMUSCULAR REEDUCATION: CPT | Mod: GP | Performed by: PHYSICAL THERAPIST

## 2018-10-19 NOTE — PROGRESS NOTES
PAIN PHYSICAL THERAPY PROGRESS NOTE  Patient Name: Savita Matthews      YOB: 1980     Medical Record Number: [id number]  Diagnosis: Cervicalgia    Visit: 4/13    Subjective: Patient reports no change in right side neck pain, bilateral shoulders and right triceps area pain.    Self Care  HEP: indep  Walking/Aerobic Activity: 20' x 2-3x/wk; resume aquatic exercise start with 5' and progress gradually  Posture: indep  Breathing/Relaxation: CD, Insight Timer  Heat/Ice: heating pad, rice sock  Mini Breaks: indep  Pacing: indep      Objective Findings:  OBSERVATION: Patient demonstrates forward head, protracted shoulders and thoracic kyphosis in standing and sitting posture.  Performed relaxation meditation x 3'  Instructed in beginning supine spinal rotation; global pattern.  Issued handout.      Treatment Interventions:  Neuromuscular Reeducation:   For 45 minutes as above.  __________________________________________________________________  Instruction on home program: rolling chest, rolling pelvis, self hug, spinal rotation    Assessment:  Ongoing Functional Limitations Include:  Patient tolerated/responded well to treatment    Intensity Level: 3 (1=low intensity; 5=high intensity)  Demonstrates/Verbalizes Technique: 5 (1= poor technique-difficulty performing exercises,significant cues required; 5= good technique-performs exercises without cues)  Body Awareness: 4 (1=low awareness; 5=high awareness)  Posture/Stability: 3 (1= poor posture, stability; 5= good posture, stability)  Motivational Level: Cooperative  Response to Teaching: cooperative  Factors that affect learning: None    _______________________________________________________________________  Plan of Care  Continue PT to support reactivation and integration of self regulation pain management skills;  Continue with prescribed plan of care - progress as tolerated.  Focus next session will be on: monitor self cares, consider bike, NM as able,  progress differentiated spinal rotation     Present:  NA     Total Visit Time:  45 minutes    Therapist: Maryanne Sevilla PT             Date: 10/19/2018

## 2018-10-19 NOTE — MR AVS SNAPSHOT
After Visit Summary   10/19/2018    Savita Matthews    MRN: [id number]           Patient Information     Date Of Birth          1980        Visit Information        Provider Department      10/19/2018 2:30 PM Maryanne Sevilla PT [facility]        Today's Diagnoses     Cervicalgia    -  1       Follow-ups after your visit        Your next 10 appointments already scheduled     Oct 26, 2018  2:30 PM CDT   Return Visit with Maryanne Sevilla PT   [facility] ([facility])    [address] [address] [address]   [phone]            Nov 02, 2018  3:00 PM CDT   Return Visit with KRAIG Echeverria CNP   [facility] ([facility])    [address] [address] [address]   [phone]              Who to contact     If you have questions or need follow up information about today's clinic visit or your schedule please contact [facility] directly at [phone].  Normal or non-critical lab and imaging results will be communicated to you by LabRootshart, letter or phone within 4 business days after the clinic has received the results. If you do not hear from us within 7 days, please contact the clinic through IM-Senset or phone. If you have a critical or abnormal lab result, we will notify you by phone as soon as possible.  Submit refill requests through [employer] or call your pharmacy and they will forward the refill request to us. Please allow 3 business days for your refill to be completed.          Additional Information About Your Visit        LabRootshart Information     [employer] gives you secure access to your electronic health record. If you see a primary care provider, you can also send messages to your care team and make appointments. If you have questions, please call your primary care clinic.  If you do not have a primary care provider, please call  [phone] and they will assist you.        Care EveryWhere ID     This is your Care EveryWhere ID. This could be used by other organizations to access your [place] medical records  [id number]         Blood Pressure from Last 3 Encounters:   10/04/18 (!) 141/91   09/28/18 138/81   08/31/18 148/88    Weight from Last 3 Encounters:   10/04/18 76.2 kg (168 lb)   09/28/18 76.2 kg (168 lb)   08/31/18 76.7 kg (169 lb)              We Performed the Following     NEUROMUSCULAR RE-EDUCATION        Primary Care Provider Office Phone # Fax #    [facility] [phone] [address].  [facility] [address]        Equal Access to Services     [facility]: Hadii aad chilango hadasho Soomaali, waaxda luqadaha, qaybta kaalmada adeegyada, alexandria franklin . So [facility] [phone].    ATENCIÓN: Si habla español, tiene a lui disposición servicios gratuitos de asistencia lingüística. Abbie al [phone].    We comply with applicable federal civil rights laws and [place] laws. We do not discriminate on the basis of race, color, national origin, age, disability, sex, sexual orientation, or gender identity.            Thank you!     Thank you for choosing [facility]  for your care. Our goal is always to provide you with excellent care. Hearing back from our patients is one way we can continue to improve our services. Please take a few minutes to complete the written survey that you may receive in the mail after your visit with us. Thank you!             Your Updated Medication List - Protect others around you: Learn how to safely use, store and throw away your medicines at www.disposemymeds.org.          This list is accurate as of 10/19/18  3:26 PM.  Always use your most recent med list.                   Brand Name Dispense Instructions for use Diagnosis    ATIVAN PO      Take 0.5 mg by mouth 3 times daily as needed        citalopram 20 MG tablet     celeXA     Take 20 mg by mouth daily        CYMBALTA 30 MG EC capsule   Generic drug:  DULoxetine      Take 60 mg by mouth At Bedtime        GABAPENTIN PO      Take 200 mg by mouth At Bedtime        promethazine 25 MG tablet    PHENERGAN     Take 25 mg by mouth every 6 hours as needed for nausea        RIZATRIPTAN BENZOATE PO      Take 5 mg by mouth once as needed for migraine        TIZANIDINE HCL PO      Take 2 mg by mouth once        TYLENOL PO      Take 500 mg by mouth 2 times daily

## 2018-10-22 ENCOUNTER — MYC MEDICAL ADVICE (OUTPATIENT)
Dept: PALLIATIVE MEDICINE | Facility: CLINIC | Age: 38
End: 2018-10-22

## 2018-10-22 DIAGNOSIS — M79.18 MYOFASCIAL PAIN: Primary | ICD-10-CM

## 2018-10-22 NOTE — TELEPHONE ENCOUNTER
Last trigger point injection was 10/4/18. Patient requesting another one with steroid.    Per last trigger point visit:    Follow-up includes:   -f/u with the referring provider  -repeat as needed    Please advise and order if appropriate. (see patient request below.)    Leticia GLYNNN-RN Care Coordinator  [facility]

## 2018-10-22 NOTE — TELEPHONE ENCOUNTER
My chart sent from patient:              Can we do another round of trigger point injections my next appointment?  I felt like it helped for a little bit,  maybe the steroid would help for longer?      Leticia GLYNNN-RN Care Coordinator  [facility] [place]

## 2018-10-23 NOTE — TELEPHONE ENCOUNTER
DANIELLA for patient to schedule TPI       Sarah Carpenter    [facility]

## 2018-10-23 NOTE — TELEPHONE ENCOUNTER
Scheduled for TPI 11/02/18. Closing    Theresa GLYNNN, RN Care Coordinator  [facility]

## 2018-10-23 NOTE — TELEPHONE ENCOUNTER
Would be reasonable to repeat with steroid. Order placed.    KRAIG Holt CNP  [facility] [place]

## 2018-10-26 ENCOUNTER — OFFICE VISIT (OUTPATIENT)
Dept: PALLIATIVE MEDICINE | Facility: CLINIC | Age: 38
End: 2018-10-26
Payer: COMMERCIAL

## 2018-10-26 DIAGNOSIS — M54.2 CERVICALGIA: Primary | ICD-10-CM

## 2018-10-26 PROCEDURE — 97112 NEUROMUSCULAR REEDUCATION: CPT | Mod: GP | Performed by: PHYSICAL THERAPIST

## 2018-10-26 NOTE — MR AVS SNAPSHOT
After Visit Summary   10/26/2018    Savita Matthews    MRN: [id number]           Patient Information     Date Of Birth          1980        Visit Information        Provider Department      10/26/2018 2:30 PM Maryanne Sevilla PT [facility]        Today's Diagnoses     Cervicalgia    -  1       Follow-ups after your visit        Your next 10 appointments already scheduled     Nov 02, 2018  2:00 PM CDT   PROCEDURE with KRAIG Echeverria CNP   [facility] ([facility])    [address] [address] [address]   [phone]              Who to contact     If you have questions or need follow up information about today's clinic visit or your schedule please contact [facility] directly at [phone].  Normal or non-critical lab and imaging results will be communicated to you by MyChart, letter or phone within 4 business days after the clinic has received the results. If you do not hear from us within 7 days, please contact the clinic through MyChart or phone. If you have a critical or abnormal lab result, we will notify you by phone as soon as possible.  Submit refill requests through [employer] or call your pharmacy and they will forward the refill request to us. Please allow 3 business days for your refill to be completed.          Additional Information About Your Visit        MyChart Information     [employer] gives you secure access to your electronic health record. If you see a primary care provider, you can also send messages to your care team and make appointments. If you have questions, please call your primary care clinic.  If you do not have a primary care provider, please call [phone] and they will assist you.        Care EveryWhere ID     This is your Care EveryWhere ID. This could be used by other organizations to access your [place] medical records  [id number]         Blood Pressure from  Last 3 Encounters:   10/04/18 (!) 141/91   09/28/18 138/81   08/31/18 148/88    Weight from Last 3 Encounters:   10/04/18 76.2 kg (168 lb)   09/28/18 76.2 kg (168 lb)   08/31/18 76.7 kg (169 lb)              We Performed the Following     NEUROMUSCULAR RE-EDUCATION        Primary Care Provider Office Phone # Fax #    [facility] [phone] [address].  [facility] [address]        Equal Access to Services     [facility]: Hadii aad ku hadasho Soomaali, waaxda luqadaha, qaybta kaalmada adechichoyada, alexandria franklin . So [facility] [phone].    ATENCIÓN: Si habla español, tiene a lui disposición servicios gratuitos de asistencia lingüística. [facility] [phone].    We comply with applicable federal civil rights laws and [place] laws. We do not discriminate on the basis of race, color, national origin, age, disability, sex, sexual orientation, or gender identity.            Thank you!     Thank you for choosing [facility]  for your care. Our goal is always to provide you with excellent care. Hearing back from our patients is one way we can continue to improve our services. Please take a few minutes to complete the written survey that you may receive in the mail after your visit with us. Thank you!             Your Updated Medication List - Protect others around you: Learn how to safely use, store and throw away your medicines at www.disposemymeds.org.          This list is accurate as of 10/26/18  3:04 PM.  Always use your most recent med list.                   Brand Name Dispense Instructions for use Diagnosis    ATIVAN PO      Take 0.5 mg by mouth 3 times daily as needed        citalopram 20 MG tablet    celeXA     Take 20 mg by mouth daily        CYMBALTA 30 MG EC capsule   Generic drug:  DULoxetine      Take 60 mg by mouth At Bedtime        GABAPENTIN PO      Take 200 mg by mouth At Bedtime        promethazine 25 MG tablet     PHENERGAN     Take 25 mg by mouth every 6 hours as needed for nausea        RIZATRIPTAN BENZOATE PO      Take 5 mg by mouth once as needed for migraine        TIZANIDINE HCL PO      Take 2 mg by mouth once        TYLENOL PO      Take 500 mg by mouth 2 times daily

## 2018-10-26 NOTE — PROGRESS NOTES
PAIN PHYSICAL THERAPY PROGRESS NOTE  Patient Name: Savita Matthews      YOB: 1980     Medical Record Number: [id number]  Diagnosis: Cervicalgia    Visit: 5/13    Subjective: Patient reports feeling more stiffness and pain in neck and upper back, right > left.  Feels it may be related to weather changes.  Able to hike 2 miles last weekend.    Self Care  HEP: indep  Walking/Aerobic Activity: 20' x 2-3s/wk; resume aquatic ex  Posture: indep  Breathing/Relaxation: CD, Insight Timer  Heat/Ice: rice sock  Mini Breaks: indep  Pacing: indep      Objective Findings:  OBSERVATION: Patient demonstrates forward head, protracted shoulders and thoracic kyphosis in standing and sitting posture.  Noted suboccipital muscle tightness and right C5 psp muscle bud.  Performed 10-step protocol CST.  Required folded towel support at posterior right shoulder to avoid muscle spasm right upper back while in supine position  Performed right side brachial plexus nerve manipulation.  Pt noted feeling more relaxed at end of session.      Treatment Interventions:  Neuromuscular Reeducation:   For 45 minutes as above.  __________________________________________________________________  Instruction on home program: rolling chest, rolling pelvis, self hug, spinal rotation    Assessment:  Ongoing Functional Limitations Include:  Patient tolerated/responded well to treatment    Intensity Level: 3 (1=low intensity; 5=high intensity)  Demonstrates/Verbalizes Technique: 5 (1= poor technique-difficulty performing exercises,significant cues required; 5= good technique-performs exercises without cues)  Body Awareness: 4 (1=low awareness; 5=high awareness)  Posture/Stability: 4 (1= poor posture, stability; 5= good posture, stability)  Motivational Level: Cooperative  Response to Teaching: cooperative  Factors that affect learning: None    _______________________________________________________________________  Plan of Care  Continue PT to  support reactivation and integration of self regulation pain management skills;  Continue with prescribed plan of care - progress as tolerated.  Focus next session will be on: consider bike, NM/CST/SOR as able,progress differentiated spinal rotation      Present:  NA     Total Visit Time:  45 minutes    Therapist: Maryanne Sevilla PT             Date: 10/26/2018

## 2018-11-14 DIAGNOSIS — M79.18 MYOFASCIAL PAIN: Primary | ICD-10-CM

## 2019-02-07 NOTE — PROGRESS NOTES
[facility]    Date of visit: 2/8/19    Chief complaint:   Chief Complaint   Patient presents with     Pain       Interval history:  Savita Matthews is a 39 year old female last seen by me on 9/28/18.      Recommendations/plan at the last visit included:                   1.  Pain Physical Therapy:  YES  We discussed having repeat sessions with pain PT with open communication regarding limitations. She is open to this. Would recommend scheduling follow up sessions.    2.  Pain Psychologist to address relaxation, behavioral change, coping style, and other factors important to improvement.  YES  Would recommend, I will call her when this is available at [facility]. Savita expresses interest in meeting with a psychologist. She has had benefit with EAP so far but notes symptoms of depression, anxiety, and PTSD. Order placed for psychologist today.    3.  Medication Management:     1. Savita reports some benefit with addition of gabapentin. Due to weight gain, we will keep gabapentin at current dose for now. She will monitor her weight twice weekly. If she is still noticing an increase in weight, she will call/MyChart.    4.  Potential procedures: We discussed trigger point injections vs. medial branch blocks (pain with facet loading on exam). We will start with trigger point injections. Ordered today, they will call to schedule.     5.  Follow up with KRAIG Holt CNP in 4-6 weeks.     Since her last visit, Savita Matthews reports:  -Her pain is worse than it was at last visit. She thinks part of this might be due to the worsening weather.   -As she was not covered to see a psychologist with [facility], she started seeing one with [facility] (sp?). She was diagnosed with PTSD. Finds these sessions helpful. She has been working on meditation and relaxation and has been focusing on chronic pain as well with some benefit.   -She notes her sleeping has gotten somewhat  better, thinks meditation helps with this.   -She had trigger point injections with me on 10/4/18, found this somewhat helpful for her pain for a few months. She had less numbness and tingling down her arm afterwards, this has gradually returned.    -She completed pain PT with Maryanne, denies significant benefit of working with her.   -Due to concern for weight gain and lack of benefit, she stopped gabapentin. She didn't think the benefit of pain relief was worth the weight gain.   -She stopped taking Celexa as she wondered if it has been working. She has been off of it for a few weeks and, after first week, she has been tolerating it well. She thinks part of her mood issues are related to PMDD.     Pain Information:   Pain quality: burning   Pain timing: continuous   Pain rating: intensity ranges from 5/10 to 8/10, and averages 7/10 on a 0-10 scale.   Aggravating factors include: cold, sitting, standing, moving for long   Relieving factors include: meditation, heat, hot baths/tubs    Current pain treatments:    Celexa 20mg daily- H for mood, stopped taking as she didn't think it was helping very much   Tizanidine 2mg at bedtime- SW, takes rarely   Aleve 440mg BID- [facility]   Maxalt 5mg prn- H   Norco 5/325mg- prescription from May of 2018, takes very rarely      Ativan .5mg TID- H, once every couple months for panic attacks (was taking once daily for a while)   Current MME: 0    Annual Controlled Substance Agreement/UDS due date: N/A    Past pain treatments:  1. Previous Pain Relevant Medications:  NOTE: This medication information taken from patient's intake form, not medical records.                         Opiates: Norco- H, Percocet- H, SE, itching                        NSAIDS: ibuprofen- [facility], Aleve- H, meloxicam- H                        Muscle Relaxants: Baclofen- [facility], SE, sedating, Robaxin- ?, Flexeril- SE, sedating, tizanidine- [facility]/H, SE, sedating                        Anti-migraine mediations: no               "          Anti-depressants: Cymbalta- W, Celexa- H, Zoloft- allergy                        Sleep aids: no                        Anxiolytics: Ativan- H                         Neuropathics: gabapentin- [facility]/?, SE, weight gain                                       Topicals: Aspercreme- [facility] short term, Biofreeze- [facility], short term                        Other medications not covered above: Tylenol- [facility], Aspirin- [facility]     2. Physical Therapy: yes land/aquatic physical therapy- [facility]/H, pain PT- [facility]  3. Pain Psychology: no  4. Surgery: no  5. Injections:  trigger point injections with me on 10/4/18- [facility] for a few months   x2 C7-T1 epidural steroid injections- 1st SW for a few days, 2nd NH, trigger point injections- NH  6. Chiropractic: yes- H if goes on a regular basis  7. Acupuncture: yes- H if goes on a regular basis, expensive  8. TENS Unit: no    Medications:  Current Outpatient Medications   Medication Sig Dispense Refill     Acetaminophen (TYLENOL PO) Take 500 mg by mouth 2 times daily       LORazepam (ATIVAN PO) Take 0.5 mg by mouth 3 times daily as needed        naproxen sodium (ANAPROX) 220 MG tablet Take 440 mg by mouth daily as needed for moderate pain       promethazine (PHENERGAN) 25 MG tablet Take 25 mg by mouth every 6 hours as needed for nausea       RIZATRIPTAN BENZOATE PO Take 5 mg by mouth once as needed for migraine       TIZANIDINE HCL PO Take 2 mg by mouth once         Medical History: any changes in medical history since they were last seen? No    Review of Systems:  The 14 system ROS was reviewed from the intake questionnaire, and is positive for: weight gain, fatigue, headache, back pain, neck pain, depression, anxiety  Any bowel or bladder problems: denies  Mood: \"has been really good\"     Physical Exam:  Blood pressure 136/80, weight 79.4 kg (175 lb).  General: A&O x4, no signs of distress.  Gait: Normal.  MSK exam: 5/5 upper and lower extremity strength. Tenderness to bilateral cervical " paraspinals, traps, and rhomboids.     Imaging:  MRI of cervical spine was completed on 1/8/18 (Care Everywhere) and shows:  Findings:  Straightening of the normal cervical ptosis which may be due to muscle spasm or patient positioning.  Otherwise, normal vertebral body facet alignment.  No fractures.  No vertebral body loss of height.  No spondylolisthesis.  No ligamentous injury.  Normal marrow signal.  Normal cord signal.  No intradural mass or lesion.  C1-2:  No spinal canal narrowing.  C2-3:  No spinal canal or neural foraminal narrowing.  C3-4:  No spinal canal or neural foraminal narrowing.  C4-5:  Mild posterior disk bulge with no spinal canal or neural foraminal narrowing.  C5-6:  Left paracentral and subarticular disk protrusion or disc osteophyte complex.  No narrowing of spinal canal.  Mild narrowing of the left neural foramen.  No narrowing of the right neural foramen.  C5-6:  Disc degeneration with a right paracentral subarticular disk protrusion measuring approximately 2 mm in short axis.  No narrowing of spinal canal.  Mild narrowing of the right neural foramen.  No narrowing of the left neuroforamen.  C7-T1:  No spinal canal or neural foraminal narrowing.  No spinal canal or neural foraminal narrowing is visualized upper thoracic spine.    Impression:  1. Straightening of normal cervical lordosis.  Otherwise, normal alignment.  No fractures.  2. Normal cord signal   3. At C5-6, left paracentral and subarticular disk protrusion or disk osteophyte complex. No narrowing of the spinal canal.  Mild narrowing of the left neural foramen   4. At C6-7, right paracentral and subarticular disk protrusion. No narrowing of spinal canal.  Mild narrowing of the right neural foramen   5. No spinal canal or neural foraminal narrowing at the remaining levels    Assessment:   Savita Matthews is a 39 year old female with a past medical history significant for migraines and anxiety who presents with complaints of neck  pain.      1. Neck pain- etiology likely myofascial pain. RUE numbness and tingling possibly due to trigger point, EMG ordered today for further evaluation.   2. Mental Health - the patient's mental health concerns, specifically anxiety, affect her experience of pain and contribute to her clinically significant distress.     1. Numbness and tingling    2. Myofascial pain        Plan:     1.  Pain Physical Therapy:  Pain physical therapy completed.    2.  Pain Psychologist to address relaxation, behavioral change, coping style, and other factors important to improvement.  YES  Continue working with current psychologist. Can consider working with pain psychology in [place] if helpful addition. We discussed the importance of adjusting expectations for pain management.    3.  Medication Management:     1. Savita is not interested in taking medications that would effect her mood at this time. Gabapentin was somewhat helpful but caused weight gain, Lyrica may be helpful for would likely also cause weight gain. No new medication recommendations today.    2. Tizanidine somewhat helpful but only takes at bedtime, suggested she try taking tizanidine during the daytime to monitor benefit/how she tolerates this.     4.  Potential procedures: Previous trigger point injections were helpful for a few months, helped reduce numbness and tingling. I ordered trigger point injections again today, they will call to schedule.     5.  Consider purchase of a Theracane. Could consider dry needling with PT.    6.  Follow up with KRAIG Holt CNP in 6 weeks.       I spent 30 minutes of time face to face with the patient.  Greater than 50% of this time was spent in patient counseling and/or coordination of care.    Lashell CORNELL CNP  [facility]

## 2019-02-08 ENCOUNTER — OFFICE VISIT (OUTPATIENT)
Dept: PALLIATIVE MEDICINE | Facility: CLINIC | Age: 39
End: 2019-02-08
Payer: COMMERCIAL

## 2019-02-08 VITALS — BODY MASS INDEX: 26.61 KG/M2 | DIASTOLIC BLOOD PRESSURE: 80 MMHG | SYSTOLIC BLOOD PRESSURE: 136 MMHG | WEIGHT: 175 LBS

## 2019-02-08 DIAGNOSIS — R20.0 NUMBNESS AND TINGLING: Primary | ICD-10-CM

## 2019-02-08 DIAGNOSIS — M79.18 MYOFASCIAL PAIN: ICD-10-CM

## 2019-02-08 DIAGNOSIS — R20.2 NUMBNESS AND TINGLING: Primary | ICD-10-CM

## 2019-02-08 PROCEDURE — 99214 OFFICE O/P EST MOD 30 MIN: CPT | Performed by: NURSE PRACTITIONER

## 2019-02-08 RX ORDER — NAPROXEN SODIUM 220 MG
440 TABLET ORAL DAILY PRN
COMMUNITY
End: 2019-05-10

## 2019-02-08 ASSESSMENT — PAIN SCALES - GENERAL: PAINLEVEL: SEVERE PAIN (7)

## 2019-02-08 NOTE — PATIENT INSTRUCTIONS
1.  Pain Physical Therapy:  Pain physical therapy completed.    2.  Pain Psychologist to address relaxation, behavioral change, coping style, and other factors important to improvement.  YES  Continue working with your psychologist.    3.  Medication Management:     1. Try taking tizanidine as needed during the daytime to see if this helps (when you are not working).     4.  Potential procedures: I ordered trigger point injections today, they will call to schedule.     5.  Consider purchase of a Theracane. Could consider dry needling with PT.    6.  Follow up with KRAIG Holt CNP in 6 weeks.       ----------------------------------------------------------------  Clinic Number:  [phone]   Call this number with any questions about your care and for scheduling assistance. Calls are returned Monday through Friday between 8 AM and 4:30 PM. We usually get back to you within 2 business days depending on the issue/request.       Medication refills:    For non-narcotic medications, call your pharmacy directly to request a refill. The pharmacy will contact the [facility] for authorization. Please allow 3-4 days for these refills to be processed.     For narcotic refills, call the clinic number or send a T3 MOTION message. Please contact us 7-10 days before your refill is due. The message MUST include the name of the specific medication(s) requested and how you would like to receive the prescription(s). The options are as follows:    Pain Clinic staff can mail the prescription to your pharmacy. Please tell us the name of the pharmacy.    You may pick the prescription up at the Pain Clinic (tell us the location) or during a clinic visit with your pain provider    Pain Clinic staff can deliver the prescription to the [place] pharmacy in the clinic building. Please tell us the location.      We believe regular attendance is key to your success in our program.    Any time you are unable to keep your  appointment we ask that you call us at least 24 hours in advance to let us know. This will allow us to offer the appointment time to another patient.

## 2019-02-14 ENCOUNTER — MYC MEDICAL ADVICE (OUTPATIENT)
Dept: PALLIATIVE MEDICINE | Facility: CLINIC | Age: 39
End: 2019-02-14

## 2019-02-14 DIAGNOSIS — M62.838 MUSCLE SPASM: Primary | ICD-10-CM

## 2019-02-14 RX ORDER — TIZANIDINE 2 MG/1
2 TABLET ORAL 2 TIMES DAILY PRN
Qty: 60 TABLET | Refills: 1 | Status: SHIPPED | OUTPATIENT
Start: 2019-02-14

## 2019-02-14 NOTE — TELEPHONE ENCOUNTER
Writer called pt and informed of the medication refill and dose change.      Landon Velasquez, RN  Care Coordinator   [facility] [place]

## 2019-02-14 NOTE — TELEPHONE ENCOUNTER
Signed Prescriptions:                        Disp   Refills    tiZANidine (ZANAFLEX) 2 MG tablet          60 tab*1        Sig: Take 1 tablet (2 mg) by mouth 2 times daily as needed           for muscle spasms  Authorizing Provider: KHUSHBOO MARKS that's fine.    KRAIG Holt [facility] [place]

## 2019-02-14 NOTE — TELEPHONE ENCOUNTER
Per OV 02/08/19:  2. Tizanidine somewhat helpful but only takes at bedtime, suggested she try taking tizanidine during the daytime to monitor benefit/how she tolerates this.      Mychart below from patient:  Good Morning!  Would you mind refilling my Tizanidine 2 mg for me?  (The last prescription was written by a [facility] doctor, so it will not let me request through the med list.   Previous RX:   Tizanidine 2 mg tablet : Take 0.5 - 2 tablets q HS PRN (Muscle spasms)    If possible, I would like enough to be able to take the 1 daily as you have requested, but also be able to take a nighttime dose if needed as well.   Is that ok?    Thank you so much!  Savita    Routing to provider to review request. Nursing to contact accordingly.     Theresa GLYNNN, RN Care Coordinator  [facility]

## 2019-02-18 NOTE — PROGRESS NOTES
[facility] - Procedure Note    Date of Visit: 2/19/2019    Pre procedure Diagnosis: myofascial pain/myositis 60.9   Post procedure Diagnosis: Same  Procedure performed: trigger point injections  Complications: none  Operators: Lashell CORNELL CNP    /89   Pulse 73   Wt 79.4 kg (175 lb)   SpO2 97%   BMI 26.61 kg/m      Indications:   Savita Matthews is a 39 year old female with a history of myofascial pain.  Exam shows myofascial pain of the muscle groups listed below and they have tried conservative treatment including PT and medications.    Options/alternatives, benefits and risks were discussed with the patient including bleeding, infection, tissue trauma and pnuemothorax.  Questions were answered to her satisfaction and she agrees to proceed. Voluntary informed consent was obtained and signed.     Vitals allergies and medications were reviewed.    Procedure:  After getting informed consent, a Pause for the Cause was performed.    Trigger points were identified by patient, and marked when appropriate.  The area was prepped with Chloroprep.    Using clean technique, injections were completed using a 25G, 1.5 inch needle.  After negative aspiration, injection was completed.  A total of 4 locations were injected.  When possible, tissue was retracted from the chest wall to avoid lung injury.    Muscle groups injected:  Right rhomboids and bilateral trapezius.      Injection solution contained:  10ml of 0.5% bupivacaine & 40mg kenolog.    Hemostasis was achieved, the area was cleaned, and bandaids were placed when appropriate.  The patient tolerated the procedure well.  Breath sounds were normal.      Follow-up includes:   -f/u with the referring provider  -repeat as needed      Lashell CORNELL CNP  [facility] [place]

## 2019-02-19 ENCOUNTER — OFFICE VISIT (OUTPATIENT)
Dept: PALLIATIVE MEDICINE | Facility: CLINIC | Age: 39
End: 2019-02-19
Attending: NURSE PRACTITIONER
Payer: COMMERCIAL

## 2019-02-19 VITALS
WEIGHT: 175 LBS | OXYGEN SATURATION: 97 % | SYSTOLIC BLOOD PRESSURE: 129 MMHG | HEART RATE: 73 BPM | DIASTOLIC BLOOD PRESSURE: 89 MMHG | BODY MASS INDEX: 26.61 KG/M2

## 2019-02-19 DIAGNOSIS — M79.18 MYOFASCIAL PAIN: Primary | ICD-10-CM

## 2019-02-19 PROCEDURE — 20552 NJX 1/MLT TRIGGER POINT 1/2: CPT | Performed by: NURSE PRACTITIONER

## 2019-02-19 ASSESSMENT — PAIN SCALES - GENERAL: PAINLEVEL: EXTREME PAIN (8)

## 2019-02-19 NOTE — PATIENT INSTRUCTIONS
[facility]   Post Procedure Instructions    Today you had:  trigger point injections       Medications used:  bupivicaine   kenolog         Go to the emergency room if you develop any shortness of breath    Monitor the injection sites for signs and symptoms of infection-fever, chills, redness, swelling, warmth, or drainage to areas.    You may have soreness at injection sites for up to 24 hours.    You may apply ice to the painful areas to help minimize the discomfort of the needle pokes.    Do not apply heat to sites for at least 12 hours.    You may use anti-inflammatory medications or Tylenol for pain control if necessary    Pain Clinic phone number during work hours Monday-Friday:  [phone]    After hours provider line: [phone]

## 2019-02-25 ENCOUNTER — MYC MEDICAL ADVICE (OUTPATIENT)
Dept: PALLIATIVE MEDICINE | Facility: CLINIC | Age: 39
End: 2019-02-25

## 2019-02-26 NOTE — TELEPHONE ENCOUNTER
Mychart below sent to patient. Will leave encounter open for patient response/chart review by nursing.     Cooper Barney,     It seems like you see Lashell more for your neck then your lower back.. I would suggest making a follow up appointment to discuss it in detail.  In the meantime, you can continue to use conservative measures like Tylenol and Ibuprofen (provided you have no issues with taking these medications).  I have enclosed below the general guidelines for Tylenol and Ibuprofen. You can try to alleviate the pain by applying low heat for 20 minutes followed by gentle stretching of the area. Hopefully this will help lessen the pain that you have restarting. If it continues and you feel you want to be seen for it, call [phone] for an appointment.   In regards to the EMG, I am sorry they have not contacted you yet.  You certainly can reach out to get this scheduled by calling[facility] [phone]      Ibuprofen:Pain   (Oral) Non-prescription dosing, 200 to 400 mg orally every 4 to 6 hours as needed, MAX 1200 mg/day; do not take longer than 10 days unless directed by a physician    Tylenol:Pain (Mild to Moderate)   650 mg orally every 4 to 6 hours as needed, MAX: 3250 mg/24 hours    (Extra-strength) 1000 mg orally every 6 hours as needed; MAX: 3000 mg/24 hours    Take care!    Theresa GLYNNN, RN Care Coordinator  [facility]    Mychart below from patient.   Hi  Quick follow-up plus a question.  The trigger point injection helped a little.  I think I had one day that I only had pain in my shoulders which was nice.  I've been noticing pain in my lower lumbar again the past few days... Actually to the point I was unable to sleep last night.  Not quite sure what to do about that.   Also the nerve test has not been scheduled yet... I haven't gotten a phone call.  Is it normal for it to take this long?  Is there a number I can call?  Thanks!  Savita

## 2019-02-27 NOTE — TELEPHONE ENCOUNTER
From: Savita Matthews      Created: 2/27/2019 8:05 AM        I called the EMG clinic and they said they didn't receive the fax.    Is there someone at the [facility] location that can do it instead... since they seem to be an outside company I think I would rather have it at a [employer]. (We tried [facility] first hoping it was [place])     Thank you! :)   Savita

## 2019-03-01 NOTE — TELEPHONE ENCOUNTER
Annexon message sent to pt:    Srinath Barney,     Unfortunately, we do not have any EMG services at the [facility] location in [place]. We can refax the order to the [facility] in [place], or we could see if Lashell Nguyễn CNP would refer you to the [facility] in [place] if that would be more convenient.  Neither are within [place] but we frequently refer to them for EMGs.      Please let us know how you would like to proceed.     Thank you,     Edelmira Cartwright, GRETTAN, RN-BC  Patient Care Supervisor/Care Coordinator  [facility]

## 2019-03-08 NOTE — TELEPHONE ENCOUNTER
Nohemy below sent to patient.     Tiffanymeggan Savita,     Were you ever able to schedule your EMG through [facility]? Or did you want this to be sent to [facility] (they have a [place] office)?      Theresa GLYNNN, RN Care Coordinator  [facility]

## 2019-03-11 NOTE — TELEPHONE ENCOUNTER
KidBookhart message from patient on 3/10 at 2018:      If there's no place in [place],  than [place] is fine.  I have not yet gotten a call from them to schedule it though.    Barbara Barney  -------------  Refaxed EMG order to [facility]; [phone] and received confirmation of successful transmission.     Board a Boat message sent to pt:    Srinath Barney,     I have re-faxed the EMG order to the [facility].  I am not sure about their process but often someone will reach out to you after receiving the order. Sometimes it is best for you to call them at your convenience to schedule the appointment.  The phone number is [phone].  They have multiple locations so just let them know that you would like to be seen in [place].     Take care,     Edelmira Cartwright, BSN, RN-BC  Patient Care Supervisor/Care Coordinator  [facility]

## 2019-03-22 ENCOUNTER — TRANSFERRED RECORDS (OUTPATIENT)
Dept: HEALTH INFORMATION MANAGEMENT | Facility: CLINIC | Age: 39
End: 2019-03-22

## 2019-03-28 NOTE — PROGRESS NOTES
[facility]    Date of visit: 3/29/19    Chief complaint:   Chief Complaint   Patient presents with     Pain       Interval history:  Savita Matthews is a 39 year old female last seen by me on 2/8/19.      Recommendations/plan at the last visit included:   1.  Pain Physical Therapy:  Pain physical therapy completed.    2.  Pain Psychologist to address relaxation, behavioral change, coping style, and other factors important to improvement.  YES  Continue working with current psychologist. Can consider working with pain psychology in [place] if helpful addition. We discussed the importance of adjusting expectations for pain management.    3.  Medication Management:     1. Savita is not interested in taking medications that would effect her mood at this time. Gabapentin was somewhat helpful but caused weight gain, Lyrica may be helpful for would likely also cause weight gain. No new medication recommendations today.    2. Tizanidine somewhat helpful but only takes at bedtime, suggested she try taking tizanidine during the daytime to monitor benefit/how she tolerates this.     4.  Potential procedures: Previous trigger point injections were helpful for a few months, helped reduce numbness and tingling. I ordered trigger point injections again today, they will call to schedule.     5.  Consider purchase of a Theracane. Could consider dry needling with PT.    6.  Follow up with KRAIG Holt CNP in 6 weeks.     Since her last visit, Savita Matthews reports:  -Her pain is somewhat better than it was at last visit.   -She had trigger point injections with me on 2/19/19. She states it took a couple weeks to start having improvement but is now having good pain relief, has less constant pain.   -She had a EMG completed at [facility], was told that she has nerve damage at C7. Her right arm numbness and tingling (including 4th and 5th digit) is more bothersome symptoms.   -She tried taking  "tizanidine as needed during the day in addition to night time dosing, however felt \"weird\" so stopped. Continues at bedtime with benefit.   -She hasn't been to see her psychologist in a while, plans to return soon but has been busy with her kid's activities. She would like to return soon, has been told her PTSD is improving.   -She has been practicing some stretches and exercises from pain PT on a regular basis. She would like to get over to the pool and start swimming at the [employer] soon.   -She state she is interested in getting outside and going on walks.   -She has not purchased a Theracane yet.       Pain Information:   Pain quality: aching, numb, burning, stabbing   Pain timing: continuous   Pain rating: intensity ranges from 2/10 to 8/10, and averages 5/10 on a 0-10 scale.   Aggravating factors include: cold, sitting, standing, moving for long   Relieving factors include: meditation, heat, hot baths    Current pain treatments:    Tizanidine 2mg at bedtime- [facility]   Aleve 440mg BID- [facility]   Maxalt 5mg prn- H      Ativan .5mg TID- H, once every couple months for panic attacks (was taking once daily for a while)   Current MME: 0    Annual Controlled Substance Agreement/UDS due date: N/A    Past pain treatments:  1. Previous Pain Relevant Medications:  NOTE: This medication information taken from patient's intake form, not medical records.                         Opiates: Norco- H, Percocet- H, SE, itching                        NSAIDS: ibuprofen- [facility], Aleve- H, meloxicam- H                        Muscle Relaxants: Baclofen- [facility], SE, sedating, Robaxin- ?, Flexeril- SE, sedating, tizanidine- [facility]/H, SE, sedating                        Anti-migraine mediations: no                        Anti-depressants: Cymbalta- W, Celexa- H, didn't feel as though needed it, Zoloft- allergy                        Sleep aids: no                        Anxiolytics: Ativan- H                         Neuropathics: gabapentin- " "[facility]/?, SE, weight gain                                       Topicals: Aspercreme- [facility] short term, Biofreeze- [facility], short term                        Other medications not covered above: Tylenol- [facility], Aspirin- [facility]     2. Physical Therapy: yes land/aquatic physical therapy- [facility]/H, pain PT- [facility]  3. Pain Psychology: no  4. Surgery: no  5. Injections: trigger point injections bupivacaine and kenolog with me on 2/19/19- [facility]  trigger point injections with me on 10/4/18- [facility] for a few months   x2 C7-T1 epidural steroid injections- 1st [facility] for a few days, 2nd [facility], \"they were awful, I'd rather not do those again,\" trigger point injections- [facility]  6. Chiropractic: yes- H if goes on a regular basis  7. Acupuncture: yes- H if goes on a regular basis, expensive  8. TENS Unit: no    Medications:  Current Outpatient Medications   Medication Sig Dispense Refill     LORazepam (ATIVAN PO) Take 0.5 mg by mouth 3 times daily as needed        naproxen sodium (ANAPROX) 220 MG tablet Take 440 mg by mouth daily as needed for moderate pain       promethazine (PHENERGAN) 25 MG tablet Take 25 mg by mouth every 6 hours as needed for nausea       RIZATRIPTAN BENZOATE PO Take 5 mg by mouth once as needed for migraine       tiZANidine (ZANAFLEX) 2 MG tablet Take 1 tablet (2 mg) by mouth 2 times daily as needed for muscle spasms 60 tablet 1     Acetaminophen (TYLENOL PO) Take 500 mg by mouth 2 times daily         Medical History: any changes in medical history since they were last seen? No    Review of Systems:  The 14 system ROS was reviewed from the intake questionnaire, and is positive for:  headache, back pain, neck pain, numbness and tingling, anxiety, mood swings  Any bowel or bladder problems: denies  Mood: \"slightly better\"     Physical Exam:  Blood pressure 138/88, pulse 72, weight 77.6 kg (171 lb), SpO2 98 %.  General: A&O x4, no signs of distress.  Gait: Normal.  Neuro exam: 5/5 upper and lower extremity strength.     Imaging:  MRI of " cervical spine was completed on 1/8/18 (Care Everywhere) and shows:  Findings:  Straightening of the normal cervical ptosis which may be due to muscle spasm or patient positioning.  Otherwise, normal vertebral body facet alignment.  No fractures.  No vertebral body loss of height.  No spondylolisthesis.  No ligamentous injury.  Normal marrow signal.  Normal cord signal.  No intradural mass or lesion.  C1-2:  No spinal canal narrowing.  C2-3:  No spinal canal or neural foraminal narrowing.  C3-4:  No spinal canal or neural foraminal narrowing.  C4-5:  Mild posterior disk bulge with no spinal canal or neural foraminal narrowing.  C5-6:  Left paracentral and subarticular disk protrusion or disc osteophyte complex.  No narrowing of spinal canal.  Mild narrowing of the left neural foramen.  No narrowing of the right neural foramen.  C5-6:  Disc degeneration with a right paracentral subarticular disk protrusion measuring approximately 2 mm in short axis.  No narrowing of spinal canal.  Mild narrowing of the right neural foramen.  No narrowing of the left neuroforamen.  C7-T1:  No spinal canal or neural foraminal narrowing.  No spinal canal or neural foraminal narrowing is visualized upper thoracic spine.    Impression:  1. Straightening of normal cervical lordosis.  Otherwise, normal alignment.  No fractures.  2. Normal cord signal   3. At C5-6, left paracentral and subarticular disk protrusion or disk osteophyte complex. No narrowing of the spinal canal.  Mild narrowing of the left neural foramen   4. At C6-7, right paracentral and subarticular disk protrusion. No narrowing of spinal canal.  Mild narrowing of the right neural foramen   5. No spinal canal or neural foraminal narrowing at the remaining levels    Assessment:   Savita Matthews is a 39 year old female with a past medical history significant for migraines and anxiety who presents with complaints of neck pain.      1. Neck pain- etiology likely myofascial pain.  RUE numbness and tingling possibly due to nerve damage per patient report on EMG results.   2. Mental Health - the patient's mental health concerns, specifically anxiety, affect her experience of pain and contribute to her clinically significant distress.     1. Myofascial pain    2. Cervicalgia        Plan:    We discussed that Saivta has completed participation in our pain program. She has completed pain PT, injections, chiropractic care, acupuncture, and medication management. Overall, she does report improvement in pain. She will follow up with her primary care provider and return to clinic as needed for trigger point injections.    Recommendations:  1. Continue trigger point injections as needed moving forward. She can call/Kathihart for repeat injections but would recommend at least 3 months between injections. Could potentially consider dry needling with [facility] PT, briefly discussed today.   2. Continue tizanidine as needed at bedtime. She is not interested in additional medication management at this time, no new medications recommended.  3. Continue regular physical activity. Start swimming in the pool when ready to do so.   4. Still have not received results of EMG. GUSTAVO completed today. Will call/Tonot Savita with results review.   5. Follow up with me as needed.       I spent 30 minutes of time face to face with the patient.  Greater than 50% of this time was spent in patient counseling and/or coordination of care.    Lashell CORNELL [facility]

## 2019-03-29 ENCOUNTER — OFFICE VISIT (OUTPATIENT)
Dept: PALLIATIVE MEDICINE | Facility: CLINIC | Age: 39
End: 2019-03-29
Payer: COMMERCIAL

## 2019-03-29 VITALS
DIASTOLIC BLOOD PRESSURE: 88 MMHG | BODY MASS INDEX: 26 KG/M2 | SYSTOLIC BLOOD PRESSURE: 138 MMHG | HEART RATE: 72 BPM | WEIGHT: 171 LBS | OXYGEN SATURATION: 98 %

## 2019-03-29 DIAGNOSIS — M79.18 MYOFASCIAL PAIN: Primary | ICD-10-CM

## 2019-03-29 DIAGNOSIS — M54.2 CERVICALGIA: ICD-10-CM

## 2019-03-29 PROCEDURE — 99214 OFFICE O/P EST MOD 30 MIN: CPT | Performed by: NURSE PRACTITIONER

## 2019-03-29 ASSESSMENT — PAIN SCALES - GENERAL: PAINLEVEL: SEVERE PAIN (6)

## 2019-03-29 NOTE — PATIENT INSTRUCTIONS
1. Continue tizanidine as needed at bedtime.  2. Monitor effect from trigger point injections. Hopefully will have a few months of good relief. Can repeat these as needed, last had trigger point injections on 2/19/19.  3. Follow up with primary care provider as planned.  4. Return to clinic as needed.     ----------------------------------------------------------------  Clinic Number:  [phone]   Call this number with any questions about your care and for scheduling assistance. Calls are returned Monday through Friday between 8 AM and 4:30 PM. We usually get back to you within 2 business days depending on the issue/request.       Medication refills:    For non-narcotic medications, call your pharmacy directly to request a refill. The pharmacy will contact the [facility] Management Center for authorization. Please allow 3-4 days for these refills to be processed.     For narcotic refills, call the clinic number or send a Simalaya message. Please contact us 7-10 days before your refill is due. The message MUST include the name of the specific medication(s) requested and how you would like to receive the prescription(s). The options are as follows:    Pain Clinic staff can mail the prescription to your pharmacy. Please tell us the name of the pharmacy.    You may pick the prescription up at the Pain Clinic (tell us the location) or during a clinic visit with your pain provider    Pain Clinic staff can deliver the prescription to the [place] pharmacy in the clinic building. Please tell us the location.      We believe regular attendance is key to your success in our program.    Any time you are unable to keep your appointment we ask that you call us at least 24 hours in advance to let us know. This will allow us to offer the appointment time to another patient.

## 2019-04-04 ENCOUNTER — TELEPHONE (OUTPATIENT)
Dept: PALLIATIVE MEDICINE | Facility: CLINIC | Age: 39
End: 2019-04-04

## 2019-04-04 NOTE — TELEPHONE ENCOUNTER
Received EMG results from [facility]. Motor unit recruitment moderately diminished but no evidence of radiculopathy or nerve damage noted. Left voicemail reviewing these results.    KRAIG Holt [facility] [place]

## 2019-04-05 ENCOUNTER — MYC MEDICAL ADVICE (OUTPATIENT)
Dept: PALLIATIVE MEDICINE | Facility: CLINIC | Age: 39
End: 2019-04-05

## 2019-04-05 DIAGNOSIS — M54.12 CERVICAL RADICULOPATHY: Primary | ICD-10-CM

## 2019-04-08 NOTE — TELEPHONE ENCOUNTER
Was able to speak with Keily from [facility].     KRAIG Holt [facility] [place]

## 2019-04-08 NOTE — TELEPHONE ENCOUNTER
"Mychart chart below from patient.     Good morning,   I got your message, so just so I am clear, there is definitely no damage or issue with the C8 like the Doc at the [facility] thought?  When he did the test, it didn't sound like he thought my reactions were just from the needle.  He said one thing was normal but there was \"definite damage\" to another part.    What can we do to move forward?  I do know this is good news, it was just kind of nice having something that we could potentially fix.  Is there anything we can do about it?  Or do you think this is another \"new normal\" I have to put up with?    Thanks so much.    Savita GLYNNN, RN Care Coordinator  [facility]    "

## 2019-04-08 NOTE — TELEPHONE ENCOUNTER
4-8-2019 at 1:22 PM    Reason for call: returning call  Additional comments: Keily (with [facility]) returned [facility]'s call.  Gopi had left a number where she could be reached but the phone number didn't go through. Keily can be reached at [phone].      Kiesha Williston  Patient Representative  [facility]
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
## 2019-04-15 NOTE — TELEPHONE ENCOUNTER
Routing to provider for order for Topamax.    I'm on board with not repeating the epidural injection.  I'd be interested in trying the Topamax.  I've had a tubal ligation so that is not a worry at all.  And I don't really drink much, so again, not an issue.  It looks like Topamax is useful for Migraines too... So that is encouraging. I'll look through micromedex more, so I know what I have to watch for.  I also will talk to my pharmacist.    Fingers Crossed!    Thanks,  Savita Slater response to patient    Srinath Barney,     I will pass your message along to Lashell Nguyễn. She is out of the office today but should be back tomorrow. Thanks!    Theresa GLYNNN, RN Care Coordinator  [facility]

## 2019-04-16 RX ORDER — TOPIRAMATE 25 MG/1
50 TABLET, FILM COATED ORAL 2 TIMES DAILY
Qty: 120 TABLET | Refills: 1 | Status: SHIPPED | OUTPATIENT
Start: 2019-04-16 | End: 2019-08-08

## 2019-04-16 NOTE — TELEPHONE ENCOUNTER
Lmt below sent to patient.     Lashell Herbert did go ahead and prescribe Topamax. It can be helpful for both nerve pain and in migraine prevention.  Please see the dosing instructions. Let me know if you have any questions.      1 tab= 25mg  AM                               PM  0                                  25mg (1 tab).  If tolerating, after 1 week go to the next line.  25mg (1 tab)                25mg (1 tab).  If tolerating, after 1 week go to the next line.  25mg (1 tab)                50mg (2 tabs).  If tolerating, after 1 week go to the next line.  50mg (2 tabs)              50mg (2 tabs). Call us when you are at this dose or with any concerns     -remain well hydrated, due to risk of kidney stones  -do not drive until you know how it affects you  -new numbness or tingling in the toes may be related to how well hydrated you are- if this occurs- drink more fluids and it should get better     Theresa GLYNNN, RN Care Coordinator  [facility]

## 2019-04-16 NOTE — TELEPHONE ENCOUNTER
Signed Prescriptions:                        Disp   Refills    topiramate (TOPAMAX) 25 MG tablet          120 ta*1        Sig: Take 2 tablets (50 mg) by mouth 2 times daily  Authorizing Provider: KHUSHBOO MARKS    I think it is very reasonable to give Topamax a try next. This is true, it can be helpful for both nerve pain and in migraine prevention. I am hopeful this will help!  Please relay dosing instructions to Savita.     1 tab= 25mg  AM   PM  0   25mg (1 tab).  If tolerating, after 1 week go to the next line.  25mg (1 tab)  25mg (1 tab).  If tolerating, after 1 week go to the next line.  25mg (1 tab)  50mg (2 tabs).  If tolerating, after 1 week go to the next line.  50mg (2 tabs)  50mg (2 tabs). Call us when you are at this dose or with any concerns    -remain well hydrated, due to risk of kidney stones  -do not drive until you know how it affects you  -new numbness or tingling in the toes may be related to how well hydrated you are- if this occurs- drink more fluids and it should get better    KRAIG Holt [facility]

## 2019-04-18 NOTE — TELEPHONE ENCOUNTER
Xytis User Last Read On   Savita Matthews 4/17/2019  7:06 AM     Will leave encounter open for patient response/chart review by nursing.

## 2019-04-25 ENCOUNTER — MYC MEDICAL ADVICE (OUTPATIENT)
Dept: PALLIATIVE MEDICINE | Facility: CLINIC | Age: 39
End: 2019-04-25

## 2019-04-25 NOTE — TELEPHONE ENCOUNTER
Message from pt at 1541:    Thank you, I will follow up with my primary.    As for the medication, I'm not seeing a huge change yet,  but I'm not at the full strength yet either.  I am however sleeping very well on this medication!  So that's a bonus!  Hopeful to see if it will start to help with the nerve pain!  -------------------  Message sent to pt:    Lashell Low, RAQUEL had a chance to review your message and is reluctant to make a medication change while you are still working your way up on Topamax. She would like you to keep an eye on the side effects from Topamax and let her know if they become too bothersome. Lashell said that we could consider a different anti-inflammatory for a while, perhaps Mobic, but again, it is often best to make one medication change at a time. Lashell said that muscle relaxants haven't been helpful for you and she does not recommend opioids for chronic pain management.     Please keep us updated on how you are doing.     Take care,     Edelmira Cartwright, GRETTAN, RN-BC  Patient Care Supervisor/Care Coordinator  [facility]

## 2019-04-25 NOTE — TELEPHONE ENCOUNTER
I am reluctant to make a medication change while we are still working our way up on Topamax. She should keep an eye on side effects from Topamax and let me know if they become too bothersome. We could consider a different anti-inflammatory for a while, perhaps Mobic? Otherwise muscle relaxants haven't been helpful and I do not recommend opioids for chronic pain management.     Agree with plan to discuss restrictions with primary care provider.     KRAIG Holt CNP  [facility]

## 2019-04-25 NOTE — TELEPHONE ENCOUNTER
Nohemy message from patient on 4/25 at 1441:    We've had a process change at work.  Im now required to walk a lot more.  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    We haven't quite figured out how to make this new process work,  but it's definitely not going away.  My question is, Should I be on restriction? I do have an appointment with my primary on 4/30, should I ask him?    Naproxen isn't cutting it at all.    Still working up on the Topamax. At 25mg Bid. Have a little fuzzy head, and fizzy drinks taste flat,  but I need something to work.   Savita Jimenez  -------------  No return visit on file.  Message sent to pt:    Cooper Barney,     I am sorry that the process changes at your work is impacting the level of pain you are experiencing. It will be best for you to discuss possible restrictions with your primary care provider since Lashell Nguyễn CNP does not typically complete that type of paperwork. I will have Lashell review the medications updates you provided and will get back to you with any other recommendations.     Take care,     Edelmira Cartwright, GRETTAN, RN-BC  Patient Care Supervisor/Care Coordinator  [facility]

## 2019-05-05 ENCOUNTER — MYC MEDICAL ADVICE (OUTPATIENT)
Dept: PALLIATIVE MEDICINE | Facility: CLINIC | Age: 39
End: 2019-05-05

## 2019-05-06 NOTE — TELEPHONE ENCOUNTER
Navagis message below sent to patient.    Srinath Barney,    I'm sorry to hear about your illness. Bummer! Hopefully you are able to be in to see your primary care provider soon and start feeling better.     Yes it is possible that you have a small amount of atrophy from the steroid injection but is actually VERY uncommon to develop with trigger point injections and should definitely not cause pain. If it is some atrophy, this should actually improve with time. If you would like to come into clinic we can take a look to see if that is the cause.     KRAIG Williamson CNP  [facility]

## 2019-05-06 NOTE — TELEPHONE ENCOUNTER
My charts sent from patient:    Thank you for your reply.     Quick update,  from last Thursday to Wednesday I had been pretty sick with either a bad cold or something (I hate to say the flu, because chances are it wasn't,  but it was bad) anyway,  yesterday and today my pain in my back is bad-up to 9-10. I was actually in tears twice today from the pain.     I haven't gotten into my primary yet (he canceled my appointment due to being sick) but hopefully in the next week I'll see him.   I don't know if it's my process change at work,  my illness, or the weather that caused this back slide, but I wanted to make sure you were in the loop.   I think the topamax is helping my arm..but the pain in my back is so severe it's hard to tell.       This may be a long shot, but have you ever seen someone get atrophy from a trigger point injection? I fear that's what happened.  I dont think it explains the immense pain I'm in, but the dent? I think that's where one of the injections were?     Leticia GLYNNN-RN Care Coordinator  [facility]

## 2019-05-07 NOTE — TELEPHONE ENCOUNTER
My chart read by patient:    From  Jess Nguyễn, APRN CNP To  Savita Matthews Sent and Delivered  5/6/2019  3:07 PM   Last Read in MyChart  5/6/2019  9:28 PM by Savita Matthews     No follow up appt made at this time.     Leticia GLYNNN-RN Care Coordinator  [facility]

## 2019-05-10 ENCOUNTER — OFFICE VISIT (OUTPATIENT)
Dept: PALLIATIVE MEDICINE | Facility: CLINIC | Age: 39
End: 2019-05-10
Payer: COMMERCIAL

## 2019-05-10 VITALS
BODY MASS INDEX: 25.24 KG/M2 | SYSTOLIC BLOOD PRESSURE: 134 MMHG | OXYGEN SATURATION: 99 % | DIASTOLIC BLOOD PRESSURE: 85 MMHG | WEIGHT: 166 LBS | HEART RATE: 84 BPM

## 2019-05-10 DIAGNOSIS — M79.10 MYALGIA: ICD-10-CM

## 2019-05-10 DIAGNOSIS — M79.18 MYOFASCIAL PAIN: Primary | ICD-10-CM

## 2019-05-10 DIAGNOSIS — M54.12 CERVICAL RADICULOPATHY: ICD-10-CM

## 2019-05-10 PROCEDURE — 99214 OFFICE O/P EST MOD 30 MIN: CPT | Performed by: NURSE PRACTITIONER

## 2019-05-10 RX ORDER — NAPROXEN 500 MG/1
500 TABLET ORAL 2 TIMES DAILY WITH MEALS
Qty: 120 TABLET | Refills: 1 | Status: SHIPPED | OUTPATIENT
Start: 2019-05-10

## 2019-05-10 RX ORDER — HYDROCODONE BITARTRATE AND ACETAMINOPHEN 5; 325 MG/1; MG/1
TABLET ORAL
COMMUNITY
Start: 2019-05-07

## 2019-05-10 ASSESSMENT — PAIN SCALES - GENERAL: PAINLEVEL: EXTREME PAIN (9)

## 2019-05-10 NOTE — PROGRESS NOTES
[facility]    Date of visit: 5/10/19    Chief complaint:   Chief Complaint   Patient presents with     Pain       Interval history:  Savita Matthews is a 39 year old female last seen by me on 3/29/19.      Recommendations/plan at the last visit included:  We discussed that Savita has completed participation in our pain program. She has completed pain PT, injections, chiropractic care, acupuncture, and medication management. Overall, she does report improvement in pain. She will follow up with her primary care provider and return to clinic as needed for trigger point injections.    Recommendations:  1. Continue trigger point injections as needed moving forward. She can call/[facility] for repeat injections but would recommend at least 3 months between injections. Could potentially consider dry needling with [facility], briefly discussed today.   2. Continue tizanidine as needed at bedtime. She is not interested in additional medication management at this time, no new medications recommended.  3. Continue regular physical activity. Start swimming in the pool when ready to do so.   4. Still have not received results of EMG. GUSTAVO completed today. Will call/Tonot Savita with results review.   5. Follow up with me as needed.       Since her last visit, Savita Matthews reports:  -Her pain is worse than it was at last visit.   -She reports an increase in stress related to a new process change at work and more physical demands. She also reports a flu like illness 4/25-4/30. Feels like she never got to take of herself due to demands at work and this has made her pain worse.   -She notes the development of a dimple on her skin that she thinks may be related to trigger point injections on 2/19/19. Kenolog was used for this injection and this is a possible complication. She has also had benefit from trigger point injections and would be interested in repeating.   -She was able to increase her topamax 25mg qam  and 50mg in the evening, wants to increase slowly (accidentally started at full dose initially and experienced cognitive side effects). She thinks that Topamax has helped with  numbness and tingling in right arm. She has not had a migraine since starting, hasn't needed any Maxalt. She has lost about 10lbs with this as well, hasn't felt urge to snack. She has noted that carbonation tastes flat.   -She has been using tizanidine at bedtime, this is somewhat helpful. She hasn't been sleeping well lately.   -She saw her primary care provider recently and was given work restrictions. She will be working half days for the next 2 weeks. She does report her pain has improved somewhat over the last two days since starting these restrictions. She was given a prescription of Norco (gets 20 tablets once yearly) at this office visit, uses only for severe pain.   -She has been less active lately as a result of increased pain. She continues to try to go on walks.       Pain Information:   Pain quality: aching, unbearable, burning, tiring, exhausting, throbbing, stabbing   Pain timing: continuous   Pain rating: intensity ranges from 4/10 to 10/10, and averages 8/10 on a 0-10 scale.   Aggravating factors include: cold, sitting, standing, moving for long, driving   Relieving factors include: meditation, heat, hot baths    Current pain treatments:    Topamax 50mg BID- [facility] for nerve pain and migraine prevention, taking 25mg qam and 50mg qhs   Tizanidine 2mg at bedtime- [facility], less so lately   Norco 5/325mg prn- [facility], 20 tabs/year, has been taking lately   Aleve 440mg BID- [facility]   Maxalt 5mg prn- H, hasn't needed in at least a mo      Ativan .5mg TID- H, once every couple months for panic attacks (was taking once daily for a while)   Current MME: 0    Annual Controlled Substance Agreement/UDS due date: N/A    Past pain treatments:  1. Previous Pain Relevant Medications:  NOTE: This medication information taken from patient's intake form, not  "medical records.                         Opiates: Norco- H, Percocet- H, SE, itching                        NSAIDS: ibuprofen- [facility], Aleve- H, meloxicam- H                        Muscle Relaxants: Baclofen- [facility], SE, sedating, Robaxin- ?, Flexeril- SE, sedating, tizanidine- [facility], SE, sedating                        Anti-migraine mediations: no                        Anti-depressants: Cymbalta- W, Celexa- H, didn't feel as though needed it, Zoloft- allergy                        Sleep aids: no                        Anxiolytics: Ativan- H                         Neuropathics: gabapentin- [facility]/?, SE, weight gain                                       Topicals: Aspercreme- [facility] short term, Biofreeze- [facility], short term                        Other medications not covered above: Tylenol- [facility], Aspirin- [facility]     2. Physical Therapy: yes land/aquatic physical therapy- [facility], pain PT- [facility]  3. Pain Psychology: no  4. Surgery: no  5. 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 Chiropractic: yes- H if goes on a regular basis  7. Acupuncture: yes- H if goes on a regular basis, expensive  8. TENS Unit: no    Medications:  Current Outpatient Medications   Medication Sig Dispense Refill     Acetaminophen (TYLENOL PO) Take 500 mg by mouth 2 times daily       HYDROcodone-acetaminophen (NORCO) 5-325 MG tablet        LORazepam (ATIVAN PO) Take 0.5 mg by mouth 3 times daily as needed        naproxen (NAPROSYN) 500 MG tablet Take 1 tablet (500 mg) by mouth 2 times daily (with meals) 120 tablet 1     promethazine (PHENERGAN) 25 MG tablet Take 25 mg by mouth every 6 hours as needed for nausea       RIZATRIPTAN BENZOATE PO Take 5 mg by " mouth once as needed for migraine       tiZANidine (ZANAFLEX) 2 MG tablet Take 1 tablet (2 mg) by mouth 2 times daily as needed for muscle spasms 60 tablet 1     topiramate (TOPAMAX) 25 MG tablet Take 2 tablets (50 mg) by mouth 2 times daily 120 tablet 1       Medical History: any changes in medical history since they were last seen? Yes- flu like symptoms    Review of Systems:  The 14 system ROS was reviewed from the intake questionnaire, and is positive for:  fever/chills, fatigue, cough, back pain, neck pain, numbness and tingling, stress  Any bowel or bladder problems: denies  Mood: increased stress related to work and pain    Physical Exam:  Blood pressure 134/85, pulse 84, weight 75.3 kg (166 lb), SpO2 99 %.  General: A&O x4, no signs of distress.  Gait: Normal.  Integumentary: approximately 1cm round area of atrophy noted to right medial rhomboid.   Neuro exam: 5/5 upper and lower extremity strength.     Imaging:  MRI of cervical spine was completed on 1/8/18 (Care Everywhere) and shows:  Findings:  Straightening of the normal cervical ptosis which may be due to muscle spasm or patient positioning.  Otherwise, normal vertebral body facet alignment.  No fractures.  No vertebral body loss of height.  No spondylolisthesis.  No ligamentous injury.  Normal marrow signal.  Normal cord signal.  No intradural mass or lesion.  C1-2:  No spinal canal narrowing.  C2-3:  No spinal canal or neural foraminal narrowing.  C3-4:  No spinal canal or neural foraminal narrowing.  C4-5:  Mild posterior disk bulge with no spinal canal or neural foraminal narrowing.  C5-6:  Left paracentral and subarticular disk protrusion or disc osteophyte complex.  No narrowing of spinal canal.  Mild narrowing of the left neural foramen.  No narrowing of the right neural foramen.  C5-6:  Disc degeneration with a right paracentral subarticular disk protrusion measuring approximately 2 mm in short axis.  No narrowing of spinal canal.  Mild  narrowing of the right neural foramen.  No narrowing of the left neuroforamen.  C7-T1:  No spinal canal or neural foraminal narrowing.  No spinal canal or neural foraminal narrowing is visualized upper thoracic spine.    Impression:  1. Straightening of normal cervical lordosis.  Otherwise, normal alignment.  No fractures.  2. Normal cord signal   3. At C5-6, left paracentral and subarticular disk protrusion or disk osteophyte complex. No narrowing of the spinal canal.  Mild narrowing of the left neural foramen   4. At C6-7, right paracentral and subarticular disk protrusion. No narrowing of spinal canal.  Mild narrowing of the right neural foramen   5. No spinal canal or neural foraminal narrowing at the remaining levels    Assessment:   Savita Matthews is a 39 year old female with a past medical history significant for migraines and anxiety who presents with complaints of neck pain.      1. Neck pain- etiology likely myofascial pain. RUE numbness and tingling possibly due to nerve damage per patient report on EMG results.   2. Mental Health - the patient's mental health concerns, specifically anxiety, affect her experience of pain and contribute to her clinically significant distress.     1. Myofascial pain    2. Cervical radiculopathy    3. Myalgia        Plan:     1.  We discussed the importance of regular physical activity but modifying according to pain levels. Advised she consider getting in the pool soon to hopefully minimize exacerbations.    2.  Pain Psychologist to address relaxation, behavioral change, coping style, and other factors important to improvement.   Hasn't been in to see therapist in a while. Highly recommended Savita schedule follow up visit.    3.  Medication Management:     1. Topamax has been helpful in migraine prevention and neuropathic pain. Also has noted weight loss. Advised she increase Topamax to 50mg BID when ready to do so.     2. She notes tizanidine has been most effective  muscle relaxant yet. Continue at bedtime prn.    3. Naproxen has been somewhat helpful, taking 440mg BID. Prescription for naproxen 500mg BID today, take one capsule twice daily with food.    4.  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 We discussed that we can repeat trigger point injections in the future but without steroid, numbing medication only. Could consider a round of dry needling with [facility], would recommend. She is unsure of which option she would like to move forward with, has a fear of needles. She will call/MyChart to let me know what she decides.    5.  Follow up with KRAIG Holt CNP in 4-6 weeks.     I spent 30 minutes of time face to face with the patient.  Greater than 50% of this time was spent in patient counseling and/or coordination of care.    Lashell CORNELL CNP  [facility]

## 2019-05-10 NOTE — TELEPHONE ENCOUNTER
Appointment today.     Leticia GLYNNN-RN Care Coordinator  [facility]

## 2019-05-10 NOTE — PATIENT INSTRUCTIONS
1.  Consider getting in the pool soon.    2.  Pain Psychologist to address relaxation, behavioral change, coping style, and other factors important to improvement. No, schedule follow up visit with therapist.    3.  Medication Management:     1. Increase Topamax when ready to 50mg twice daily.     2. Continue tizanidine at bedtime.    3. Prescription for naproxen, take one capsule twice daily.    4.  Potential procedures: can repeat trigger point injections with numbing medication only. Otherwise consider a round of dry needling. Call/MyChart to let me know what you decide.    5.  Follow up with KRAIG Holt CNP in 4-6 weeks.       ----------------------------------------------------------------  Clinic Number:  [phone]   Call this number with any questions about your care and for scheduling assistance. Calls are returned Monday through Friday between 8 AM and 4:30 PM. We usually get back to you within 2 business days depending on the issue/request.       Medication refills:    For non-opioid medications, call your pharmacy directly to request a refill. The pharmacy will contact the [facility] for authorization. Please allow 3-4 days for these refills to be processed.     For opioid refills, call the clinic number or send a Sococo message. Please contact us 7-10 days before your refill is due. The message MUST include the name of the specific medication(s) requested and how you would like to receive the prescription(s). The options are as follows:    Pain Clinic staff can mail the prescription to your pharmacy. Please tell us the name of the pharmacy.    You may pick the prescription up at the Pain Clinic (tell us the location) or during a clinic visit with your pain provider    Pain Clinic staff can deliver the prescription to the [facility] pharmacy in the clinic building. Please tell us the location.      We believe regular attendance is key to your success in our program.    Any time you  are unable to keep your appointment we ask that you call us at least 24 hours in advance to let us know. This will allow us to offer the appointment time to another patient.

## 2019-05-24 ENCOUNTER — THERAPY VISIT (OUTPATIENT)
Dept: PHYSICAL THERAPY | Facility: CLINIC | Age: 39
End: 2019-05-24
Attending: NURSE PRACTITIONER
Payer: COMMERCIAL

## 2019-05-24 DIAGNOSIS — M79.18 MYOFASCIAL PAIN: ICD-10-CM

## 2019-05-24 DIAGNOSIS — M54.2 CERVICALGIA: Primary | ICD-10-CM

## 2019-05-24 PROCEDURE — 97161 PT EVAL LOW COMPLEX 20 MIN: CPT | Mod: GP | Performed by: PHYSICAL THERAPIST

## 2019-05-24 PROCEDURE — 99207 C STAT DRY NEEDLING - IAM: CPT | Mod: 25 | Performed by: PHYSICAL THERAPIST

## 2019-05-24 PROCEDURE — 97110 THERAPEUTIC EXERCISES: CPT | Mod: GP | Performed by: PHYSICAL THERAPIST
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
## 2019-05-31 ENCOUNTER — THERAPY VISIT (OUTPATIENT)
Dept: PHYSICAL THERAPY | Facility: CLINIC | Age: 39
End: 2019-05-31
Payer: COMMERCIAL

## 2019-05-31 DIAGNOSIS — M79.18 MYOFASCIAL PAIN: ICD-10-CM

## 2019-05-31 DIAGNOSIS — M54.2 CERVICALGIA: ICD-10-CM

## 2019-05-31 PROCEDURE — 97140 MANUAL THERAPY 1/> REGIONS: CPT | Mod: GP | Performed by: PHYSICAL THERAPIST

## 2019-05-31 PROCEDURE — 97110 THERAPEUTIC EXERCISES: CPT | Mod: GP | Performed by: PHYSICAL THERAPIST

## 2019-05-31 PROCEDURE — 99207 C STAT DRY NEEDLING - IAM: CPT | Mod: 25 | Performed by: PHYSICAL THERAPIST

## 2019-06-07 ENCOUNTER — THERAPY VISIT (OUTPATIENT)
Dept: PHYSICAL THERAPY | Facility: CLINIC | Age: 39
End: 2019-06-07
Payer: COMMERCIAL

## 2019-06-07 DIAGNOSIS — M79.18 MYOFASCIAL PAIN: ICD-10-CM

## 2019-06-07 DIAGNOSIS — M54.2 CERVICALGIA: ICD-10-CM

## 2019-06-07 PROCEDURE — 97110 THERAPEUTIC EXERCISES: CPT | Mod: GP | Performed by: PHYSICAL THERAPIST

## 2019-06-07 PROCEDURE — 97112 NEUROMUSCULAR REEDUCATION: CPT | Mod: GP | Performed by: PHYSICAL THERAPIST

## 2019-06-07 PROCEDURE — 99207 C STAT DRY NEEDLING - IAM: CPT | Mod: 25 | Performed by: PHYSICAL THERAPIST

## 2019-06-21 ENCOUNTER — THERAPY VISIT (OUTPATIENT)
Dept: PHYSICAL THERAPY | Facility: CLINIC | Age: 39
End: 2019-06-21
Payer: COMMERCIAL

## 2019-06-21 DIAGNOSIS — M54.2 CERVICALGIA: ICD-10-CM

## 2019-06-21 DIAGNOSIS — M79.18 MYOFASCIAL PAIN: ICD-10-CM

## 2019-06-21 PROCEDURE — 99207 C STAT DRY NEEDLING - IAM: CPT | Mod: 25 | Performed by: PHYSICAL THERAPIST

## 2019-06-21 PROCEDURE — 97110 THERAPEUTIC EXERCISES: CPT | Mod: GP | Performed by: PHYSICAL THERAPIST

## 2019-06-21 PROCEDURE — 97014 ELECTRIC STIMULATION THERAPY: CPT | Mod: GP | Performed by: PHYSICAL THERAPIST

## 2019-06-21 PROCEDURE — 97112 NEUROMUSCULAR REEDUCATION: CPT | Mod: GP | Performed by: PHYSICAL THERAPIST

## 2019-07-05 ENCOUNTER — THERAPY VISIT (OUTPATIENT)
Dept: PHYSICAL THERAPY | Facility: CLINIC | Age: 39
End: 2019-07-05
Payer: COMMERCIAL

## 2019-07-05 DIAGNOSIS — M54.2 CERVICALGIA: ICD-10-CM

## 2019-07-05 DIAGNOSIS — M79.18 MYOFASCIAL PAIN: ICD-10-CM

## 2019-07-05 PROCEDURE — 99207 C STAT DRY NEEDLING - IAM: CPT | Mod: GP | Performed by: PHYSICAL THERAPIST
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
## 2019-07-16 ENCOUNTER — OFFICE VISIT (OUTPATIENT)
Dept: PALLIATIVE MEDICINE | Facility: CLINIC | Age: 39
End: 2019-07-16
Payer: COMMERCIAL

## 2019-07-16 VITALS — OXYGEN SATURATION: 100 % | DIASTOLIC BLOOD PRESSURE: 80 MMHG | SYSTOLIC BLOOD PRESSURE: 128 MMHG | HEART RATE: 72 BPM

## 2019-07-16 DIAGNOSIS — M54.12 CERVICAL RADICULOPATHY: Primary | ICD-10-CM

## 2019-07-16 DIAGNOSIS — M79.18 MYOFASCIAL PAIN: ICD-10-CM

## 2019-07-16 DIAGNOSIS — G89.4 CHRONIC PAIN SYNDROME: ICD-10-CM

## 2019-07-16 PROCEDURE — 99214 OFFICE O/P EST MOD 30 MIN: CPT | Performed by: NURSE PRACTITIONER

## 2019-07-16 NOTE — PATIENT INSTRUCTIONS
1.  Physical Therapy:  YES   Go to visit with Sulaiman Garcia PT for additional dry needling and continue per his recommendation.    2.  Pain Psychologist to address relaxation, behavioral change, coping style, and other factors important to improvement.  YES   Consider making a follow up appointment with your therapist when you have time.    3.  Medication Management:     1. Restart Topamax as you started with increases made weekly. Can go slower than this as tolerated. Start with one tablet at bedtime, then one tablet in the morning and evening and so on until taking 50mg twice daily.     2. Okay to continue naproxen and tizanidine as needed.    4.  Potential procedures: not at this time.     5.  Follow up with KRAIG Holt CNP in 6-8 weeks.       ----------------------------------------------------------------  Clinic Number:  [phone]     Call with any questions about your care and for scheduling assistance.     Calls are returned Monday through Friday between 8 AM and 4:30 PM. We usually get back to you within 2 business days depending on the issue/request.    If we are prescribing your medications:    For opioid medication refills, call the clinic or send a [facility] message 7 days in advance.  Please include:    Name of requested medication    Name of the pharmacy.    For non-opioid medications, call your pharmacy directly to request a refill. Please allow 3-4 days to be processed.     Per [place] State Law:    All controlled substance prescriptions must be filled within 30 days of being written.      For those controlled substances allowing refills, pickup must occur within 30 days of last fill.      We believe regular attendance is key to your success in our program!      Any time you are unable to keep your appointment we ask that you call us at least 24 hours in advance to cancel.This will allow us to offer the appointment time to another patient.     Multiple missed appointments may lead to dismissal  from the clinic.

## 2019-07-19 ENCOUNTER — THERAPY VISIT (OUTPATIENT)
Dept: PHYSICAL THERAPY | Facility: CLINIC | Age: 39
End: 2019-07-19
Payer: COMMERCIAL

## 2019-07-19 DIAGNOSIS — M54.2 CERVICALGIA: ICD-10-CM

## 2019-07-19 DIAGNOSIS — M79.18 MYOFASCIAL PAIN: ICD-10-CM

## 2019-07-19 PROCEDURE — 97140 MANUAL THERAPY 1/> REGIONS: CPT | Mod: GP | Performed by: PHYSICAL THERAPIST

## 2019-07-19 PROCEDURE — 99207 C STAT DRY NEEDLING - IAM: CPT | Mod: 25 | Performed by: PHYSICAL THERAPIST

## 2019-08-05 ENCOUNTER — THERAPY VISIT (OUTPATIENT)
Dept: PHYSICAL THERAPY | Facility: CLINIC | Age: 39
End: 2019-08-05
Payer: COMMERCIAL

## 2019-08-05 DIAGNOSIS — M54.2 CERVICALGIA: ICD-10-CM

## 2019-08-05 DIAGNOSIS — M79.18 MYOFASCIAL PAIN: ICD-10-CM

## 2019-08-05 PROCEDURE — 99207 C STAT DRY NEEDLING - IAM: CPT | Mod: GP | Performed by: PHYSICAL THERAPIST

## 2019-08-07 DIAGNOSIS — M54.12 CERVICAL RADICULOPATHY: ICD-10-CM

## 2019-08-07 NOTE — TELEPHONE ENCOUNTER
[facility] fax request from   [facility] [id number] [place] [facility] [place] [facility]  [address] [facility] [address]  Phone: [phone] Fax: [phone]    Pharmacy requesting refill(s) for topiramate (TOPAMAX) 25 MG tablet      Pt last seen on 07/16/19    No future appointments scheduled at this time    Will facilitate refill.    Ashley Steele [facility]  [place]

## 2019-08-08 ENCOUNTER — MYC REFILL (OUTPATIENT)
Dept: OTHER | Age: 39
End: 2019-08-08

## 2019-08-08 DIAGNOSIS — M54.12 CERVICAL RADICULOPATHY: ICD-10-CM

## 2019-08-08 RX ORDER — TOPIRAMATE 25 MG/1
50 TABLET, FILM COATED ORAL 2 TIMES DAILY
Qty: 120 TABLET | Refills: 1 | Status: SHIPPED | OUTPATIENT
Start: 2019-08-08 | End: 2019-10-17

## 2019-08-08 RX ORDER — TOPIRAMATE 25 MG/1
50 TABLET, FILM COATED ORAL 2 TIMES DAILY
Qty: 120 TABLET | Refills: 1 | Status: CANCELLED | OUTPATIENT
Start: 2019-08-08

## 2019-08-08 NOTE — TELEPHONE ENCOUNTER
Left message by Jessa that Rx was E-Prepcribed to:     [id number] - [place], [place] - [address]  [address]  [facility] [place] [address]  Phone: [phone] Fax: [phone]      Patient notified of dispense and start date.     Ashley Steele [facility]  [place]

## 2019-10-16 DIAGNOSIS — M54.12 CERVICAL RADICULOPATHY: ICD-10-CM

## 2019-10-17 RX ORDER — TOPIRAMATE 25 MG/1
50 TABLET, FILM COATED ORAL 2 TIMES DAILY
Qty: 120 TABLET | Refills: 1 | Status: SHIPPED | OUTPATIENT
Start: 2019-10-17

## 2024-10-28 ENCOUNTER — HOSPITAL ENCOUNTER (EMERGENCY)
Facility: CLINIC | Age: 44
Discharge: HOME | End: 2024-10-28
Attending: EMERGENCY MEDICINE | Admitting: EMERGENCY MEDICINE
Payer: COMMERCIAL

## 2024-10-28 ENCOUNTER — APPOINTMENT (OUTPATIENT)
Dept: GENERAL RADIOLOGY | Facility: CLINIC | Age: 44
End: 2024-10-28
Attending: EMERGENCY MEDICINE
Payer: COMMERCIAL

## 2024-10-28 VITALS
HEART RATE: 74 BPM | RESPIRATION RATE: 16 BRPM | HEIGHT: 67 IN | DIASTOLIC BLOOD PRESSURE: 80 MMHG | SYSTOLIC BLOOD PRESSURE: 166 MMHG | BODY MASS INDEX: 27.62 KG/M2 | WEIGHT: 176 LBS | OXYGEN SATURATION: 100 % | TEMPERATURE: 97.4 F

## 2024-10-28 DIAGNOSIS — Z86.711 HISTORY OF PULMONARY EMBOLISM: ICD-10-CM

## 2024-10-28 DIAGNOSIS — R07.9 CHEST PAIN, UNSPECIFIED TYPE: ICD-10-CM

## 2024-10-28 LAB
ALBUMIN SERPL BCG-MCNC: 4.3 G/DL (ref 3.5–5.2)
ALP SERPL-CCNC: 77 U/L (ref 40–150)
ALT SERPL W P-5'-P-CCNC: 41 U/L (ref 0–50)
ANION GAP SERPL CALCULATED.3IONS-SCNC: 11 MMOL/L (ref 7–15)
AST SERPL W P-5'-P-CCNC: 34 U/L (ref 0–45)
ATRIAL RATE - MUSE: 70 BPM
BASOPHILS # BLD AUTO: 0.1 10E3/UL (ref 0–0.2)
BASOPHILS NFR BLD AUTO: 1 %
BILIRUB SERPL-MCNC: 0.9 MG/DL
BUN SERPL-MCNC: 9.9 MG/DL (ref 6–20)
CALCIUM SERPL-MCNC: 9.3 MG/DL (ref 8.8–10.4)
CHLORIDE SERPL-SCNC: 103 MMOL/L (ref 98–107)
CREAT SERPL-MCNC: 0.9 MG/DL (ref 0.51–0.95)
D DIMER PPP FEU-MCNC: 0.27 UG/ML FEU (ref 0–0.5)
DIASTOLIC BLOOD PRESSURE - MUSE: NORMAL MMHG
EGFRCR SERPLBLD CKD-EPI 2021: 80 ML/MIN/1.73M2
EOSINOPHIL # BLD AUTO: 0.1 10E3/UL (ref 0–0.7)
EOSINOPHIL NFR BLD AUTO: 1 %
ERYTHROCYTE [DISTWIDTH] IN BLOOD BY AUTOMATED COUNT: 12.8 % (ref 10–15)
GLUCOSE SERPL-MCNC: 88 MG/DL (ref 70–99)
HCO3 SERPL-SCNC: 24 MMOL/L (ref 22–29)
HCT VFR BLD AUTO: 46.1 % (ref 35–47)
HGB BLD-MCNC: 15.1 G/DL (ref 11.7–15.7)
IMM GRANULOCYTES # BLD: 0 10E3/UL
IMM GRANULOCYTES NFR BLD: 0 %
INTERPRETATION ECG - MUSE: NORMAL
LYMPHOCYTES # BLD AUTO: 2.3 10E3/UL (ref 0.8–5.3)
LYMPHOCYTES NFR BLD AUTO: 34 %
MCH RBC QN AUTO: 29.3 PG (ref 26.5–33)
MCHC RBC AUTO-ENTMCNC: 32.8 G/DL (ref 31.5–36.5)
MCV RBC AUTO: 90 FL (ref 78–100)
MONOCYTES # BLD AUTO: 0.4 10E3/UL (ref 0–1.3)
MONOCYTES NFR BLD AUTO: 6 %
NEUTROPHILS # BLD AUTO: 3.9 10E3/UL (ref 1.6–8.3)
NEUTROPHILS NFR BLD AUTO: 57 %
NRBC # BLD AUTO: 0 10E3/UL
NRBC BLD AUTO-RTO: 0 /100
P AXIS - MUSE: 49 DEGREES
PLATELET # BLD AUTO: 141 10E3/UL (ref 150–450)
POTASSIUM SERPL-SCNC: 4.2 MMOL/L (ref 3.4–5.3)
PR INTERVAL - MUSE: 152 MS
PROT SERPL-MCNC: 7.2 G/DL (ref 6.4–8.3)
QRS DURATION - MUSE: 78 MS
QT - MUSE: 420 MS
QTC - MUSE: 453 MS
R AXIS - MUSE: 84 DEGREES
RBC # BLD AUTO: 5.15 10E6/UL (ref 3.8–5.2)
SODIUM SERPL-SCNC: 138 MMOL/L (ref 135–145)
SYSTOLIC BLOOD PRESSURE - MUSE: NORMAL MMHG
T AXIS - MUSE: 72 DEGREES
TROPONIN T SERPL HS-MCNC: <6 NG/L
VENTRICULAR RATE- MUSE: 70 BPM
WBC # BLD AUTO: 6.9 10E3/UL (ref 4–11)

## 2024-10-28 PROCEDURE — 36415 COLL VENOUS BLD VENIPUNCTURE: CPT | Performed by: EMERGENCY MEDICINE

## 2024-10-28 PROCEDURE — 84484 ASSAY OF TROPONIN QUANT: CPT | Performed by: EMERGENCY MEDICINE

## 2024-10-28 PROCEDURE — 93005 ELECTROCARDIOGRAM TRACING: CPT

## 2024-10-28 PROCEDURE — 82947 ASSAY GLUCOSE BLOOD QUANT: CPT | Performed by: EMERGENCY MEDICINE

## 2024-10-28 PROCEDURE — 85379 FIBRIN DEGRADATION QUANT: CPT | Performed by: EMERGENCY MEDICINE

## 2024-10-28 PROCEDURE — 99285 EMERGENCY DEPT VISIT HI MDM: CPT | Mod: 25

## 2024-10-28 PROCEDURE — 71046 X-RAY EXAM CHEST 2 VIEWS: CPT

## 2024-10-28 PROCEDURE — 85025 COMPLETE CBC W/AUTO DIFF WBC: CPT | Performed by: EMERGENCY MEDICINE

## 2024-10-28 ASSESSMENT — ACTIVITIES OF DAILY LIVING (ADL)
ADLS_ACUITY_SCORE: 0

## 2024-10-28 ASSESSMENT — COLUMBIA-SUICIDE SEVERITY RATING SCALE - C-SSRS
2. HAVE YOU ACTUALLY HAD ANY THOUGHTS OF KILLING YOURSELF IN THE PAST MONTH?: NO
1. IN THE PAST MONTH, HAVE YOU WISHED YOU WERE DEAD OR WISHED YOU COULD GO TO SLEEP AND NOT WAKE UP?: NO
6. HAVE YOU EVER DONE ANYTHING, STARTED TO DO ANYTHING, OR PREPARED TO DO ANYTHING TO END YOUR LIFE?: NO

## 2024-10-28 NOTE — ED TRIAGE NOTES
Patient has hx of PE and started to have chest pain that was similar to that this morning. Patient states midsternal chest that does not move anywhere, is not on any anticoags. Denies any recent travel and not on birthcontrol. Patient has hx of factor V.
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
## 2024-10-28 NOTE — DISCHARGE INSTRUCTIONS
As discussed, there is no serious cause of your chest pain found today.  I expected to improve over the next several days if it truly is from more of a musculoskeletal cause.  Continue with ibuprofen and Tylenol.  Do not hesitate to return to the ER if you are having increasing pain, lightheadedness, shortness of breath, or any other emergent concerns as you may benefit from advanced imaging of your chest at that time.    Discharge Instructions  Chest Pain    You have been seen today for chest pain or discomfort.  At this time, your provider has found no signs that your chest pain is due to a serious or life-threatening condition, (or you have declined more testing and/or admission to the hospital). However, sometimes there is a serious problem that does not show up right away. Your evaluation today may not be complete and you may need further testing and evaluation.     Generally, every Emergency Department visit should have a follow-up clinic visit with either a primary or a specialty clinic/provider. Please follow-up as instructed by your emergency provider today.  Return to the Emergency Department if:  Your chest pain changes, gets worse, starts to happen more often, or comes with less activity.  You are newly short of breath.  You get very weak or tired.  You pass out or faint.  You have any new symptoms, like fever, cough, numb legs, or you cough up blood.  You have anything else that worries you.    Until you follow-up with your regular provider, please do the following:  Take one aspirin daily unless you have an allergy or are told not to by your provider.  If a stress test appointment has been made, go to the appointment.  If you have questions, contact your regular provider.  Follow-up with your regular provider/clinic as directed; this is very important.    If you were given a prescription for medicine here today, be sure to read all of the information (including the package insert) that comes with your  prescription.  This will include important information about the medicine, its side effects, and any warnings that you need to know about.  The pharmacist who fills the prescription can provide more information and answer questions you may have about the medicine.  If you have questions or concerns that the pharmacist cannot address, please call or return to the Emergency Department.       Remember that you can always come back to the Emergency Department if you are not able to see your regular provider in the amount of time listed above, if you get any new symptoms, or if there is anything that worries you.

## (undated) RX ORDER — HYDROCODONE BITARTRATE AND ACETAMINOPHEN 5; 325 MG/1; MG/1
TABLET ORAL
Status: DISPENSED
Start: 2018-02-06

## (undated) RX ORDER — LIDOCAINE HYDROCHLORIDE 10 MG/ML
INJECTION, SOLUTION EPIDURAL; INFILTRATION; INTRACAUDAL; PERINEURAL
Status: DISPENSED
Start: 2018-02-06

## (undated) RX ORDER — HYDROCODONE BITARTRATE AND ACETAMINOPHEN 5; 325 MG/1; MG/1
TABLET ORAL
Status: DISPENSED
Start: 2018-02-23

## (undated) RX ORDER — LIDOCAINE HYDROCHLORIDE 10 MG/ML
INJECTION, SOLUTION EPIDURAL; INFILTRATION; INTRACAUDAL; PERINEURAL
Status: DISPENSED
Start: 2018-02-23

## (undated) RX ORDER — DEXAMETHASONE SODIUM PHOSPHATE 10 MG/ML
INJECTION, SOLUTION INTRAMUSCULAR; INTRAVENOUS
Status: DISPENSED
Start: 2018-02-06

## (undated) RX ORDER — DEXAMETHASONE SODIUM PHOSPHATE 10 MG/ML
INJECTION, SOLUTION INTRAMUSCULAR; INTRAVENOUS
Status: DISPENSED
Start: 2018-02-23